# Patient Record
Sex: FEMALE | Race: WHITE | NOT HISPANIC OR LATINO | Employment: FULL TIME | URBAN - METROPOLITAN AREA
[De-identification: names, ages, dates, MRNs, and addresses within clinical notes are randomized per-mention and may not be internally consistent; named-entity substitution may affect disease eponyms.]

---

## 2017-02-09 ENCOUNTER — ALLSCRIPTS OFFICE VISIT (OUTPATIENT)
Dept: OTHER | Facility: OTHER | Age: 52
End: 2017-02-09

## 2017-02-09 DIAGNOSIS — S16.1XXA STRAIN OF MUSCLE, FASCIA AND TENDON AT NECK LEVEL, INITIAL ENCOUNTER: ICD-10-CM

## 2017-03-09 ENCOUNTER — TELEPHONE (OUTPATIENT)
Dept: OTHER | Facility: HOSPITAL | Age: 52
End: 2017-03-09

## 2017-03-13 ENCOUNTER — ALLSCRIPTS OFFICE VISIT (OUTPATIENT)
Dept: OTHER | Facility: OTHER | Age: 52
End: 2017-03-13

## 2017-05-27 ENCOUNTER — GENERIC CONVERSION - ENCOUNTER (OUTPATIENT)
Dept: OTHER | Facility: OTHER | Age: 52
End: 2017-05-27

## 2017-06-06 ENCOUNTER — ALLSCRIPTS OFFICE VISIT (OUTPATIENT)
Dept: OTHER | Facility: OTHER | Age: 52
End: 2017-06-06

## 2017-06-06 DIAGNOSIS — Z12.31 ENCOUNTER FOR SCREENING MAMMOGRAM FOR MALIGNANT NEOPLASM OF BREAST: ICD-10-CM

## 2017-06-06 DIAGNOSIS — E03.9 HYPOTHYROIDISM: ICD-10-CM

## 2017-08-07 ENCOUNTER — GENERIC CONVERSION - ENCOUNTER (OUTPATIENT)
Dept: OTHER | Facility: OTHER | Age: 52
End: 2017-08-07

## 2017-08-23 DIAGNOSIS — Z12.11 ENCOUNTER FOR SCREENING FOR MALIGNANT NEOPLASM OF COLON: ICD-10-CM

## 2017-08-23 DIAGNOSIS — Z12.12 ENCOUNTER FOR SCREENING FOR MALIGNANT NEOPLASM OF RECTUM: ICD-10-CM

## 2017-10-03 ENCOUNTER — ALLSCRIPTS OFFICE VISIT (OUTPATIENT)
Dept: OTHER | Facility: OTHER | Age: 52
End: 2017-10-03

## 2017-10-03 DIAGNOSIS — E78.00 PURE HYPERCHOLESTEROLEMIA: ICD-10-CM

## 2017-10-03 DIAGNOSIS — E03.9 HYPOTHYROIDISM: ICD-10-CM

## 2017-10-03 DIAGNOSIS — G43.109 MIGRAINE WITH AURA AND WITHOUT STATUS MIGRAINOSUS, NOT INTRACTABLE: ICD-10-CM
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
## 2017-12-11 ENCOUNTER — GENERIC CONVERSION - ENCOUNTER (OUTPATIENT)
Dept: INTERNAL MEDICINE CLINIC | Facility: CLINIC | Age: 52
End: 2017-12-11

## 2018-01-12 VITALS
TEMPERATURE: 98.5 F | HEIGHT: 65 IN | BODY MASS INDEX: 24.37 KG/M2 | WEIGHT: 146.25 LBS | HEART RATE: 60 BPM | OXYGEN SATURATION: 96 % | DIASTOLIC BLOOD PRESSURE: 78 MMHG | SYSTOLIC BLOOD PRESSURE: 112 MMHG

## 2018-01-13 VITALS
TEMPERATURE: 98 F | SYSTOLIC BLOOD PRESSURE: 122 MMHG | BODY MASS INDEX: 23.41 KG/M2 | HEART RATE: 67 BPM | HEIGHT: 65 IN | DIASTOLIC BLOOD PRESSURE: 80 MMHG | OXYGEN SATURATION: 98 % | WEIGHT: 140.5 LBS

## 2018-01-14 VITALS
OXYGEN SATURATION: 98 % | WEIGHT: 143.13 LBS | BODY MASS INDEX: 23.85 KG/M2 | SYSTOLIC BLOOD PRESSURE: 124 MMHG | TEMPERATURE: 98 F | HEIGHT: 65 IN | HEART RATE: 69 BPM | DIASTOLIC BLOOD PRESSURE: 78 MMHG

## 2018-01-14 VITALS
HEART RATE: 61 BPM | OXYGEN SATURATION: 98 % | DIASTOLIC BLOOD PRESSURE: 84 MMHG | BODY MASS INDEX: 24.16 KG/M2 | TEMPERATURE: 97.8 F | HEIGHT: 65 IN | SYSTOLIC BLOOD PRESSURE: 128 MMHG | WEIGHT: 145 LBS

## 2018-04-11 ENCOUNTER — OFFICE VISIT (OUTPATIENT)
Dept: INTERNAL MEDICINE CLINIC | Age: 53
End: 2018-04-11
Payer: COMMERCIAL

## 2018-04-11 VITALS
OXYGEN SATURATION: 96 % | HEIGHT: 64 IN | SYSTOLIC BLOOD PRESSURE: 112 MMHG | DIASTOLIC BLOOD PRESSURE: 78 MMHG | TEMPERATURE: 98.2 F | BODY MASS INDEX: 24.21 KG/M2 | WEIGHT: 141.8 LBS | HEART RATE: 62 BPM

## 2018-04-11 DIAGNOSIS — L30.9 ECZEMA OF BOTH HANDS: Primary | ICD-10-CM

## 2018-04-11 DIAGNOSIS — E03.9 ACQUIRED HYPOTHYROIDISM: ICD-10-CM

## 2018-04-11 DIAGNOSIS — G43.109 MIGRAINE WITH AURA AND WITHOUT STATUS MIGRAINOSUS, NOT INTRACTABLE: ICD-10-CM

## 2018-04-11 DIAGNOSIS — M54.16 LUMBAR RADICULOPATHY: ICD-10-CM

## 2018-04-11 PROBLEM — S16.1XXA CERVICAL STRAIN: Status: ACTIVE | Noted: 2017-02-09

## 2018-04-11 PROBLEM — R82.71 BACTERIA IN URINE: Status: ACTIVE | Noted: 2018-04-11

## 2018-04-11 PROCEDURE — 99214 OFFICE O/P EST MOD 30 MIN: CPT | Performed by: INTERNAL MEDICINE

## 2018-04-11 PROCEDURE — 3008F BODY MASS INDEX DOCD: CPT | Performed by: INTERNAL MEDICINE

## 2018-04-11 RX ORDER — PROPRANOLOL HYDROCHLORIDE 80 MG/1
80 CAPSULE, EXTENDED RELEASE ORAL DAILY
Qty: 90 CAPSULE | Refills: 1 | Status: SHIPPED | OUTPATIENT
Start: 2018-04-11 | End: 2018-10-23 | Stop reason: SDUPTHER

## 2018-04-11 RX ORDER — TRIAMCINOLONE ACETONIDE 5 MG/G
OINTMENT TOPICAL 2 TIMES DAILY
COMMUNITY
Start: 2015-12-04 | End: 2021-07-14 | Stop reason: ALTCHOICE

## 2018-04-11 RX ORDER — LEVOTHYROXINE SODIUM 0.05 MG/1
50 TABLET ORAL DAILY
Qty: 90 TABLET | Refills: 1 | Status: SHIPPED | OUTPATIENT
Start: 2018-04-11 | End: 2018-10-23 | Stop reason: SDUPTHER

## 2018-04-11 RX ORDER — SUMATRIPTAN 50 MG/1
1 TABLET, FILM COATED ORAL
COMMUNITY
End: 2018-04-11 | Stop reason: SDUPTHER

## 2018-04-11 RX ORDER — HYDROXYZINE HYDROCHLORIDE 25 MG/1
25 TABLET, FILM COATED ORAL AS NEEDED
COMMUNITY
Start: 2018-03-21 | End: 2022-03-14

## 2018-04-11 RX ORDER — SUMATRIPTAN 50 MG/1
50 TABLET, FILM COATED ORAL ONCE AS NEEDED
Qty: 9 TABLET | Refills: 0 | Status: SHIPPED | OUTPATIENT
Start: 2018-04-11 | End: 2018-10-23 | Stop reason: SDUPTHER

## 2018-04-11 RX ORDER — PROPRANOLOL HYDROCHLORIDE 80 MG/1
1 CAPSULE, EXTENDED RELEASE ORAL
COMMUNITY
Start: 2014-06-24 | End: 2018-04-11 | Stop reason: SDUPTHER

## 2018-04-11 RX ORDER — LEVOTHYROXINE SODIUM 0.05 MG/1
1 TABLET ORAL DAILY
COMMUNITY
Start: 2017-06-06 | End: 2018-04-11 | Stop reason: SDUPTHER

## 2018-04-11 NOTE — PROGRESS NOTES
Assessment/Plan:    Acquired hypothyroidism    Stable  Continue with levothyroxine 50 mcg daily  Classic migraine with aura    Well controlled  Continue with propranolol 80 mg daily for prophylaxis and sumatriptan 50 mg as needed for breakthrough headaches  Lumbar radiculopathy    Pain recommend starting physical therapy exercises and following up with her Spine and Pain Specialist     Eczema of both hands    Controlled  Continue with triamcinolone ointment as needed for acute flares  Bacteria in urine   Asymptomatic  Will defer on starting antibiotic therapy at this time  Advised patient contact office if she develops any symptoms  Diagnoses and all orders for this visit:    Eczema of both hands    Lumbar radiculopathy    Acquired hypothyroidism  -     levothyroxine 50 mcg tablet; Take 1 tablet (50 mcg total) by mouth daily    Migraine with aura and without status migrainosus, not intractable  -     propranolol (INDERAL LA) 80 mg 24 hr capsule; Take 1 capsule (80 mg total) by mouth daily  -     SUMAtriptan (IMITREX) 50 mg tablet; Take 1 tablet (50 mg total) by mouth once as needed for migraine for up to 1 dose    Other orders  -     hydrOXYzine HCL (ATARAX) 25 mg tablet;   -     Discontinue: levothyroxine 50 mcg tablet; Take 1 tablet by mouth daily  -     Discontinue: propranolol (INDERAL LA) 80 mg 24 hr capsule; Take 1 capsule by mouth  -     Discontinue: SUMAtriptan (IMITREX) 50 mg tablet; Take 1 tablet by mouth  -     triamcinolone (KENALOG) 0 5 % ointment; Apply topically 2 (two) times a day          Subjective:      Patient ID: Nilsa Driver is a 45 y o  female  49-year-old female is seen today for follow-up of chronic conditions  She has no active complaints or concerns at this time  Laboratory studies reviewed today  It is worth noting that her urinalysis was positive for leukocytes at 25, but negative for nitrites    A reflex urine culture was positive for beta hemolytic group B strep, however she is asymptomatic and reports that she has never had a urinary tract infection  Regarding her migraine headaches, she is compliant with propanolol for prophylaxis and takes Imitrex as needed for breakthrough headaches  She reports that she has not been getting many headaches per month and current therapy is working appropriately  Thyroid Problem   Presents for follow-up visit  Patient reports no cold intolerance, constipation, diaphoresis, diarrhea, fatigue, heat intolerance or palpitations  The symptoms have been stable  Rash   This is a chronic problem  The current episode started more than 1 year ago  The problem is unchanged  The affected locations include the left hand  Pertinent negatives include no congestion, cough, diarrhea, fatigue, fever, rhinorrhea, shortness of breath, sore throat or vomiting  Past treatments include topical steroids  The treatment provided significant relief  Her past medical history is significant for eczema  The following portions of the patient's history were reviewed and updated as appropriate: allergies, current medications, past family history, past medical history, past social history, past surgical history and problem list     Review of Systems   Constitutional: Negative for activity change, appetite change, chills, diaphoresis, fatigue and fever  HENT: Negative for congestion, postnasal drip, rhinorrhea, sinus pain, sinus pressure, sneezing and sore throat  Eyes: Negative for visual disturbance  Respiratory: Negative for apnea, cough, choking, chest tightness, shortness of breath and wheezing  Cardiovascular: Negative for chest pain, palpitations and leg swelling  Gastrointestinal: Negative for abdominal distention, abdominal pain, anal bleeding, blood in stool, constipation, diarrhea, nausea and vomiting  Endocrine: Negative for cold intolerance and heat intolerance     Genitourinary: Negative for difficulty urinating, dysuria and hematuria  Musculoskeletal: Negative  Skin: Positive for rash  Neurological: Negative for dizziness, weakness, light-headedness, numbness and headaches  Hematological: Negative for adenopathy  Psychiatric/Behavioral: Negative for agitation, sleep disturbance and suicidal ideas  All other systems reviewed and are negative  Past Medical History:   Diagnosis Date    Allergic rhinitis due to pollen     last assessed 11/11/13    Asthma     Common migraine without aura     last assessed 06/03/14    Seasonal allergies     last assessed 11/11/13         Current Outpatient Prescriptions:     hydrOXYzine HCL (ATARAX) 25 mg tablet, , Disp: , Rfl:     levothyroxine 50 mcg tablet, Take 1 tablet (50 mcg total) by mouth daily, Disp: 90 tablet, Rfl: 1    propranolol (INDERAL LA) 80 mg 24 hr capsule, Take 1 capsule (80 mg total) by mouth daily, Disp: 90 capsule, Rfl: 1    SUMAtriptan (IMITREX) 50 mg tablet, Take 1 tablet (50 mg total) by mouth once as needed for migraine for up to 1 dose, Disp: 9 tablet, Rfl: 0    triamcinolone (KENALOG) 0 5 % ointment, Apply topically 2 (two) times a day, Disp: , Rfl:     Allergies   Allergen Reactions    Seasonal Ic [Cholestatin] Nasal Congestion       Social History   Past Surgical History:   Procedure Laterality Date    CARPAL TUNNEL RELEASE Bilateral 2003    TONSILLECTOMY       Family History   Problem Relation Age of Onset    Coronary artery disease Mother     Diabetes Mother      in her 42's    Hypertension Mother     Other Mother      2 children living    Heart failure Mother     Colon cancer Father        Objective:  /78 (BP Location: Left arm, Patient Position: Sitting, Cuff Size: Standard)   Pulse 62   Temp 98 2 °F (36 8 °C) (Tympanic)   Ht 5' 4 33" (1 634 m)   Wt 64 3 kg (141 lb 12 8 oz)   SpO2 96%   BMI 24 09 kg/m²     No results found for this or any previous visit (from the past 1344 hour(s))           Physical Exam Constitutional: She is oriented to person, place, and time  She appears well-developed and well-nourished  No distress  HENT:   Head: Normocephalic and atraumatic  Eyes: Conjunctivae and EOM are normal  Pupils are equal, round, and reactive to light  Right eye exhibits no discharge  Left eye exhibits no discharge  Neck: Normal range of motion  Neck supple  No JVD present  No thyromegaly present  Cardiovascular: Normal rate, regular rhythm, normal heart sounds and intact distal pulses  Exam reveals no gallop and no friction rub  No murmur heard  Pulmonary/Chest: Effort normal and breath sounds normal  No respiratory distress  She has no wheezes  She has no rales  She exhibits no tenderness  Abdominal: Soft  She exhibits no distension  There is no tenderness  Musculoskeletal: Normal range of motion  She exhibits no edema, tenderness or deformity  Lymphadenopathy:     She has no cervical adenopathy  Neurological: She is alert and oriented to person, place, and time  No cranial nerve deficit  Coordination normal    Skin: Skin is warm and dry  No rash noted  She is not diaphoretic  No erythema  No pallor  Psychiatric: She has a normal mood and affect  Her behavior is normal  Judgment and thought content normal    Nursing note and vitals reviewed

## 2018-04-11 NOTE — ASSESSMENT & PLAN NOTE
Pain recommend starting physical therapy exercises and following up with her Spine and Pain Specialist

## 2018-04-11 NOTE — ASSESSMENT & PLAN NOTE
Well controlled  Continue with propranolol 80 mg daily for prophylaxis and sumatriptan 50 mg as needed for breakthrough headaches

## 2018-04-11 NOTE — ASSESSMENT & PLAN NOTE
Asymptomatic  Will defer on starting antibiotic therapy at this time  Advised patient contact office if she develops any symptoms

## 2018-04-29 DIAGNOSIS — G43.109 MIGRAINE WITH AURA AND WITHOUT STATUS MIGRAINOSUS, NOT INTRACTABLE: ICD-10-CM

## 2018-04-30 RX ORDER — SUMATRIPTAN 50 MG/1
TABLET, FILM COATED ORAL
Qty: 18 TABLET | Refills: 3 | OUTPATIENT
Start: 2018-04-30

## 2018-10-23 ENCOUNTER — OFFICE VISIT (OUTPATIENT)
Dept: INTERNAL MEDICINE CLINIC | Age: 53
End: 2018-10-23
Payer: COMMERCIAL

## 2018-10-23 VITALS
HEIGHT: 64 IN | DIASTOLIC BLOOD PRESSURE: 72 MMHG | HEART RATE: 59 BPM | OXYGEN SATURATION: 99 % | TEMPERATURE: 97.2 F | BODY MASS INDEX: 24.89 KG/M2 | WEIGHT: 145.8 LBS | SYSTOLIC BLOOD PRESSURE: 100 MMHG

## 2018-10-23 DIAGNOSIS — M54.16 LUMBAR RADICULOPATHY: Primary | ICD-10-CM

## 2018-10-23 DIAGNOSIS — E03.9 ACQUIRED HYPOTHYROIDISM: ICD-10-CM

## 2018-10-23 DIAGNOSIS — G43.109 MIGRAINE WITH AURA AND WITHOUT STATUS MIGRAINOSUS, NOT INTRACTABLE: ICD-10-CM

## 2018-10-23 PROBLEM — R82.71 BACTERIA IN URINE: Status: RESOLVED | Noted: 2018-04-11 | Resolved: 2018-10-23

## 2018-10-23 PROCEDURE — 3008F BODY MASS INDEX DOCD: CPT | Performed by: INTERNAL MEDICINE

## 2018-10-23 PROCEDURE — 99214 OFFICE O/P EST MOD 30 MIN: CPT | Performed by: INTERNAL MEDICINE

## 2018-10-23 PROCEDURE — 1036F TOBACCO NON-USER: CPT | Performed by: INTERNAL MEDICINE

## 2018-10-23 RX ORDER — SUMATRIPTAN 50 MG/1
50 TABLET, FILM COATED ORAL ONCE AS NEEDED
Qty: 9 TABLET | Refills: 3 | Status: SHIPPED | OUTPATIENT
Start: 2018-10-23 | End: 2019-05-22 | Stop reason: SDUPTHER

## 2018-10-23 RX ORDER — PROPRANOLOL HYDROCHLORIDE 80 MG/1
80 CAPSULE, EXTENDED RELEASE ORAL DAILY
Qty: 90 CAPSULE | Refills: 1 | Status: SHIPPED | OUTPATIENT
Start: 2018-10-23 | End: 2019-05-07 | Stop reason: SDUPTHER

## 2018-10-23 RX ORDER — LEVOTHYROXINE SODIUM 0.05 MG/1
50 TABLET ORAL DAILY
Qty: 90 TABLET | Refills: 1 | Status: SHIPPED | OUTPATIENT
Start: 2018-10-23 | End: 2019-05-22 | Stop reason: SDUPTHER

## 2018-10-23 NOTE — PROGRESS NOTES
Assessment/Plan:    No problem-specific Assessment & Plan notes found for this encounter  Diagnoses and all orders for this visit:    Lumbar radiculopathy  Patient is a lumbar radiculopathy and she is seeing the pain management for possible epidural injection she had an MRI done results of the MRI are not in the chart  Patient has more symptoms of radicular pain on the left than on the right side she had a similar kind of problems about 6 years ago and the received 3  Epidural injections  Which improved the pain  Acquired hypothyroidism  -     levothyroxine 50 mcg tablet; Take 1 tablet (50 mcg total) by mouth daily   will order the TSH for her she does not have any signs or symptoms of hypo or hyperthyroidism  Migraine with aura and without status migrainosus, not intractable  -     propranolol (INDERAL LA) 80 mg 24 hr capsule; Take 1 capsule (80 mg total) by mouth daily  -     SUMAtriptan (IMITREX) 50 mg tablet; Take 1 tablet (50 mg total) by mouth once as needed for migraine for up to 1 dose   migraines are much better than before,  They are less frequent and less intense plan is to continue with the present management no changes        Subjective:    no new complaint except for the back pain for which she is followed up by the Pain Management and Orthopedics   Patient ID: Cheryl Lawton is a 52 y o  female  HPI    The following portions of the patient's history were reviewed and updated as appropriate: allergies, current medications, past family history, past medical history, past social history, past surgical history and problem list     Review of Systems   Constitutional: Negative for activity change, appetite change, chills, diaphoresis, fatigue and fever  HENT: Negative for congestion, postnasal drip, rhinorrhea, sinus pain, sinus pressure, sneezing and sore throat  Eyes: Negative for visual disturbance     Respiratory: Negative for apnea, cough, choking, chest tightness, shortness of breath and wheezing  Cardiovascular: Negative for chest pain, palpitations and leg swelling  Gastrointestinal: Negative for abdominal distention, abdominal pain, anal bleeding, blood in stool, constipation, diarrhea, nausea and vomiting  Endocrine: Negative for cold intolerance and heat intolerance  Genitourinary: Negative for difficulty urinating, dysuria and hematuria  Musculoskeletal: Negative  Skin: Negative for rash  Neurological: Negative for dizziness, weakness, light-headedness, numbness and headaches  Hematological: Negative for adenopathy  Psychiatric/Behavioral: Negative for agitation, sleep disturbance and suicidal ideas  All other systems reviewed and are negative          Past Medical History:   Diagnosis Date    Allergic rhinitis due to pollen     last assessed 11/11/13    Asthma     Common migraine without aura     last assessed 06/03/14    Seasonal allergies     last assessed 11/11/13         Current Outpatient Prescriptions:     hydrOXYzine HCL (ATARAX) 25 mg tablet, Take 25 mg by mouth as needed  , Disp: , Rfl:     levothyroxine 50 mcg tablet, Take 1 tablet (50 mcg total) by mouth daily, Disp: 90 tablet, Rfl: 1    propranolol (INDERAL LA) 80 mg 24 hr capsule, Take 1 capsule (80 mg total) by mouth daily, Disp: 90 capsule, Rfl: 1    SUMAtriptan (IMITREX) 50 mg tablet, Take 1 tablet (50 mg total) by mouth once as needed for migraine for up to 1 dose, Disp: 9 tablet, Rfl: 0    triamcinolone (KENALOG) 0 5 % ointment, Apply topically 2 (two) times a day, Disp: , Rfl:     Allergies   Allergen Reactions    Seasonal Ic [Cholestatin] Nasal Congestion       Social History   Past Surgical History:   Procedure Laterality Date    CARPAL TUNNEL RELEASE Bilateral 2003    TONSILLECTOMY       Family History   Problem Relation Age of Onset    Coronary artery disease Mother     Diabetes Mother         in her 42's    Hypertension Mother    Ilia Sandoval Other Mother         2 children living  Heart failure Mother     Colon cancer Father        Objective:  /72 (BP Location: Left arm, Patient Position: Sitting, Cuff Size: Adult)   Pulse 59   Temp (!) 97 2 °F (36 2 °C) (Tympanic)   Ht 5' 4 07" (1 628 m)   Wt 66 1 kg (145 lb 12 8 oz)   SpO2 99% Comment: room air  BMI 24 97 kg/m²        Physical Exam   Constitutional: She is oriented to person, place, and time  She appears well-developed and well-nourished  No distress  HENT:   Head: Normocephalic and atraumatic  Eyes: Pupils are equal, round, and reactive to light  Conjunctivae and EOM are normal  Right eye exhibits no discharge  Left eye exhibits no discharge  Neck: Normal range of motion  Neck supple  No JVD present  No thyromegaly present  Cardiovascular: Normal rate, regular rhythm, normal heart sounds and intact distal pulses  Exam reveals no gallop and no friction rub  No murmur heard  Pulmonary/Chest: Effort normal and breath sounds normal  No respiratory distress  She has no wheezes  She has no rales  She exhibits no tenderness  Abdominal: Soft  She exhibits no distension  There is no tenderness  Musculoskeletal: Normal range of motion  She exhibits no edema, tenderness or deformity  Lymphadenopathy:     She has no cervical adenopathy  Neurological: She is alert and oriented to person, place, and time  No cranial nerve deficit  Coordination normal    Skin: Skin is warm and dry  No rash noted  She is not diaphoretic  No erythema  No pallor  Psychiatric: She has a normal mood and affect  Her behavior is normal  Judgment and thought content normal    Nursing note and vitals reviewed  No results found for this or any previous visit (from the past 672 hour(s))

## 2019-01-18 ENCOUNTER — OFFICE VISIT (OUTPATIENT)
Dept: INTERNAL MEDICINE CLINIC | Facility: CLINIC | Age: 54
End: 2019-01-18
Payer: COMMERCIAL

## 2019-01-18 VITALS
TEMPERATURE: 98.1 F | SYSTOLIC BLOOD PRESSURE: 110 MMHG | OXYGEN SATURATION: 99 % | HEIGHT: 64 IN | WEIGHT: 146.4 LBS | HEART RATE: 72 BPM | BODY MASS INDEX: 25 KG/M2 | DIASTOLIC BLOOD PRESSURE: 82 MMHG

## 2019-01-18 DIAGNOSIS — B30.9 VIRAL CONJUNCTIVITIS OF RIGHT EYE: Primary | ICD-10-CM

## 2019-01-18 PROCEDURE — 3008F BODY MASS INDEX DOCD: CPT | Performed by: INTERNAL MEDICINE

## 2019-01-18 PROCEDURE — 1036F TOBACCO NON-USER: CPT | Performed by: INTERNAL MEDICINE

## 2019-01-18 PROCEDURE — 99213 OFFICE O/P EST LOW 20 MIN: CPT | Performed by: INTERNAL MEDICINE

## 2019-01-18 RX ORDER — NAPROXEN SODIUM 220 MG
220 TABLET ORAL AS NEEDED
COMMUNITY
End: 2021-01-20

## 2019-01-18 RX ORDER — AZELASTINE HYDROCHLORIDE 0.5 MG/ML
1 SOLUTION/ DROPS OPHTHALMIC 2 TIMES DAILY
Qty: 6 ML | Refills: 0 | Status: SHIPPED | OUTPATIENT
Start: 2019-01-18 | End: 2019-05-22 | Stop reason: ALTCHOICE

## 2019-01-18 NOTE — PROGRESS NOTES
Assessment/Plan:    Viral conjunctivitis of right eye  Will treat with azelastine eye drops, BID to the right eye until resolution of symptoms  Continue with warm compresses  Discussed droplet precautions  She is to contact office for worsening symptoms  Diagnoses and all orders for this visit:    Viral conjunctivitis of right eye  -     azelastine (OPTIVAR) 0 05 % ophthalmic solution; Administer 1 drop to the right eye 2 (two) times a day    Other orders  -     naproxen sodium (ALEVE) 220 MG tablet; Take 220 mg by mouth as needed for mild pain          Subjective:      Patient ID: Padilla Smith is a 52 y o  female  52year old female is seen today with acute symptoms of right eye conjunctivitis  Symptoms started yesterday  She has been applying warm compresses as well as Visine eyedrops with no improvement of symptoms  She denies any recent sick contacts  Conjunctivitis    The current episode started yesterday  The onset was sudden  The problem has been unchanged  The problem is mild  Nothing (warm compress, Visine) relieves the symptoms  Nothing aggravates the symptoms  Associated symptoms include eye discharge (Clear) and eye redness  Pertinent negatives include no orthopnea, no fever, no decreased vision, no double vision, no eye itching, no photophobia, no abdominal pain, no constipation, no diarrhea, no nausea, no vomiting, no congestion, no ear discharge, no ear pain, no headaches, no hearing loss, no mouth sores, no rhinorrhea, no sore throat, no stridor, no swollen glands, no muscle aches, no neck pain, no neck stiffness, no cough, no URI, no wheezing, no rash, no diaper rash and no eye pain  The eye pain is mild  The right eye is affected  The eye pain is not associated with movement  The eyelid exhibits redness         The following portions of the patient's history were reviewed and updated as appropriate: allergies, current medications, past family history, past medical history, past social history, past surgical history and problem list     Review of Systems   Constitutional: Negative for activity change, appetite change, chills, diaphoresis, fatigue and fever  HENT: Negative for congestion, ear discharge, ear pain, hearing loss, mouth sores, postnasal drip, rhinorrhea, sinus pain, sinus pressure, sneezing and sore throat  Eyes: Positive for discharge (Clear) and redness  Negative for double vision, photophobia, pain, itching and visual disturbance  Respiratory: Negative for apnea, cough, choking, chest tightness, shortness of breath, wheezing and stridor  Cardiovascular: Negative for chest pain, palpitations, orthopnea and leg swelling  Gastrointestinal: Negative for abdominal distention, abdominal pain, anal bleeding, blood in stool, constipation, diarrhea, nausea and vomiting  Endocrine: Negative for cold intolerance and heat intolerance  Genitourinary: Negative for difficulty urinating, dysuria and hematuria  Musculoskeletal: Negative  Negative for neck pain  Skin: Negative  Negative for rash  Neurological: Negative for dizziness, weakness, light-headedness, numbness and headaches  Hematological: Negative for adenopathy  Psychiatric/Behavioral: Negative for agitation, sleep disturbance and suicidal ideas  All other systems reviewed and are negative          Past Medical History:   Diagnosis Date    Allergic rhinitis due to pollen     last assessed 11/11/13    Asthma     Common migraine without aura     last assessed 06/03/14    Seasonal allergies     last assessed 11/11/13         Current Outpatient Prescriptions:     hydrOXYzine HCL (ATARAX) 25 mg tablet, Take 25 mg by mouth as needed  , Disp: , Rfl:     levothyroxine 50 mcg tablet, Take 1 tablet (50 mcg total) by mouth daily, Disp: 90 tablet, Rfl: 1    naproxen sodium (ALEVE) 220 MG tablet, Take 220 mg by mouth as needed for mild pain, Disp: , Rfl:     propranolol (INDERAL LA) 80 mg 24 hr capsule, Take 1 capsule (80 mg total) by mouth daily, Disp: 90 capsule, Rfl: 1    SUMAtriptan (IMITREX) 50 mg tablet, Take 1 tablet (50 mg total) by mouth once as needed for migraine for up to 1 dose, Disp: 9 tablet, Rfl: 3    azelastine (OPTIVAR) 0 05 % ophthalmic solution, Administer 1 drop to the right eye 2 (two) times a day, Disp: 6 mL, Rfl: 0    triamcinolone (KENALOG) 0 5 % ointment, Apply topically 2 (two) times a day, Disp: , Rfl:     Allergies   Allergen Reactions    Seasonal Ic [Cholestatin] Nasal Congestion       Social History   Past Surgical History:   Procedure Laterality Date    CARPAL TUNNEL RELEASE Bilateral 2003    TONSILLECTOMY       Family History   Problem Relation Age of Onset    Coronary artery disease Mother     Diabetes Mother         in her 42's    Hypertension Mother     Other Mother         2 children living    Heart failure Mother     Colon cancer Father        Objective:  /82 (BP Location: Left arm, Patient Position: Sitting, Cuff Size: Adult)   Pulse 72   Temp 98 1 °F (36 7 °C) (Oral)   Ht 5' 4" (1 626 m)   Wt 66 4 kg (146 lb 6 4 oz)   SpO2 99% Comment: room air  BMI 25 13 kg/m²     No results found for this or any previous visit (from the past 1344 hour(s))  Physical Exam   Constitutional: She is oriented to person, place, and time  She appears well-developed and well-nourished  No distress  HENT:   Head: Normocephalic and atraumatic  Eyes: Pupils are equal, round, and reactive to light  Conjunctivae and EOM are normal  Right eye exhibits no discharge  Left eye exhibits no discharge  Neck: Normal range of motion  Neck supple  No JVD present  No thyromegaly present  Cardiovascular: Normal rate, regular rhythm, normal heart sounds and intact distal pulses  Exam reveals no gallop and no friction rub  No murmur heard  Pulmonary/Chest: Effort normal and breath sounds normal  No respiratory distress  She has no wheezes  She has no rales   She exhibits no tenderness  Abdominal: Soft  She exhibits no distension  There is no tenderness  Musculoskeletal: Normal range of motion  She exhibits no edema, tenderness or deformity  Lymphadenopathy:     She has no cervical adenopathy  Neurological: She is alert and oriented to person, place, and time  No cranial nerve deficit  Coordination normal    Skin: Skin is warm and dry  No rash noted  She is not diaphoretic  No erythema  No pallor  Psychiatric: She has a normal mood and affect  Her behavior is normal  Judgment and thought content normal    Nursing note and vitals reviewed

## 2019-05-07 DIAGNOSIS — G43.109 MIGRAINE WITH AURA AND WITHOUT STATUS MIGRAINOSUS, NOT INTRACTABLE: ICD-10-CM

## 2019-05-07 RX ORDER — PROPRANOLOL HYDROCHLORIDE 80 MG/1
80 CAPSULE, EXTENDED RELEASE ORAL DAILY
Qty: 90 CAPSULE | Refills: 1 | Status: SHIPPED | OUTPATIENT
Start: 2019-05-07 | End: 2019-05-22 | Stop reason: SDUPTHER

## 2019-05-22 ENCOUNTER — OFFICE VISIT (OUTPATIENT)
Dept: INTERNAL MEDICINE CLINIC | Age: 54
End: 2019-05-22
Payer: COMMERCIAL

## 2019-05-22 VITALS
HEART RATE: 66 BPM | TEMPERATURE: 98 F | BODY MASS INDEX: 25.68 KG/M2 | SYSTOLIC BLOOD PRESSURE: 110 MMHG | WEIGHT: 149.6 LBS | DIASTOLIC BLOOD PRESSURE: 80 MMHG | OXYGEN SATURATION: 98 %

## 2019-05-22 DIAGNOSIS — E83.51 HYPOCALCEMIA: ICD-10-CM

## 2019-05-22 DIAGNOSIS — E55.9 VITAMIN D DEFICIENCY: Primary | ICD-10-CM

## 2019-05-22 DIAGNOSIS — E03.9 ACQUIRED HYPOTHYROIDISM: ICD-10-CM

## 2019-05-22 DIAGNOSIS — G43.109 MIGRAINE WITH AURA AND WITHOUT STATUS MIGRAINOSUS, NOT INTRACTABLE: ICD-10-CM

## 2019-05-22 DIAGNOSIS — M54.16 LUMBAR RADICULOPATHY: ICD-10-CM

## 2019-05-22 PROBLEM — B30.9 VIRAL CONJUNCTIVITIS OF RIGHT EYE: Status: RESOLVED | Noted: 2019-01-18 | Resolved: 2019-05-22

## 2019-05-22 PROBLEM — S16.1XXA CERVICAL STRAIN: Status: RESOLVED | Noted: 2017-02-09 | Resolved: 2019-05-22

## 2019-05-22 PROBLEM — L30.9 ECZEMA OF BOTH HANDS: Status: RESOLVED | Noted: 2018-04-11 | Resolved: 2019-05-22

## 2019-05-22 PROCEDURE — 1036F TOBACCO NON-USER: CPT | Performed by: INTERNAL MEDICINE

## 2019-05-22 PROCEDURE — 99214 OFFICE O/P EST MOD 30 MIN: CPT | Performed by: INTERNAL MEDICINE

## 2019-05-22 RX ORDER — PROPRANOLOL HYDROCHLORIDE 80 MG/1
80 CAPSULE, EXTENDED RELEASE ORAL DAILY
Qty: 90 CAPSULE | Refills: 1 | Status: SHIPPED | OUTPATIENT
Start: 2019-05-22 | End: 2019-11-12

## 2019-05-22 RX ORDER — LEVOTHYROXINE SODIUM 0.05 MG/1
50 TABLET ORAL DAILY
Qty: 90 TABLET | Refills: 1 | Status: SHIPPED | OUTPATIENT
Start: 2019-05-22 | End: 2020-01-28 | Stop reason: SDUPTHER

## 2019-05-22 RX ORDER — MELATONIN
1000 DAILY
Qty: 30 TABLET | Refills: 5
Start: 2019-05-22

## 2019-05-22 RX ORDER — SUMATRIPTAN 50 MG/1
50 TABLET, FILM COATED ORAL ONCE AS NEEDED
Qty: 9 TABLET | Refills: 3 | Status: SHIPPED | OUTPATIENT
Start: 2019-05-22 | End: 2020-06-16 | Stop reason: SDUPTHER

## 2019-05-22 RX ORDER — B-COMPLEX WITH VITAMIN C
1 TABLET ORAL
Qty: 30 TABLET | Refills: 5
Start: 2019-05-22

## 2019-06-17 ENCOUNTER — TELEPHONE (OUTPATIENT)
Dept: INTERNAL MEDICINE CLINIC | Facility: CLINIC | Age: 54
End: 2019-06-17

## 2019-06-17 PROBLEM — E55.9 VITAMIN D DEFICIENCY: Status: ACTIVE | Noted: 2019-06-17

## 2019-11-12 ENCOUNTER — OFFICE VISIT (OUTPATIENT)
Dept: INTERNAL MEDICINE CLINIC | Age: 54
End: 2019-11-12
Payer: COMMERCIAL

## 2019-11-12 VITALS
DIASTOLIC BLOOD PRESSURE: 90 MMHG | WEIGHT: 147.4 LBS | HEART RATE: 58 BPM | OXYGEN SATURATION: 96 % | BODY MASS INDEX: 25.16 KG/M2 | TEMPERATURE: 97.7 F | SYSTOLIC BLOOD PRESSURE: 124 MMHG | HEIGHT: 64 IN

## 2019-11-12 DIAGNOSIS — G43.109 MIGRAINE WITH AURA AND WITHOUT STATUS MIGRAINOSUS, NOT INTRACTABLE: ICD-10-CM

## 2019-11-12 DIAGNOSIS — L03.012 CELLULITIS OF FINGER OF LEFT HAND: ICD-10-CM

## 2019-11-12 DIAGNOSIS — Z11.59 NEED FOR HEPATITIS C SCREENING TEST: ICD-10-CM

## 2019-11-12 DIAGNOSIS — Z12.39 BREAST CANCER SCREENING: Primary | ICD-10-CM

## 2019-11-12 PROCEDURE — 99214 OFFICE O/P EST MOD 30 MIN: CPT | Performed by: INTERNAL MEDICINE

## 2019-11-12 PROCEDURE — 1036F TOBACCO NON-USER: CPT | Performed by: INTERNAL MEDICINE

## 2019-11-12 RX ORDER — PROPRANOLOL HCL 60 MG
60 CAPSULE, EXTENDED RELEASE 24HR ORAL DAILY
Qty: 15 CAPSULE | Refills: 0 | Status: SHIPPED | OUTPATIENT
Start: 2019-11-12 | End: 2020-06-16 | Stop reason: ALTCHOICE

## 2019-11-12 RX ORDER — DOXYCYCLINE HYCLATE 100 MG
100 TABLET ORAL 2 TIMES DAILY
Qty: 14 TABLET | Refills: 0 | Status: SHIPPED | OUTPATIENT
Start: 2019-11-12 | End: 2019-11-19

## 2019-11-12 NOTE — PROGRESS NOTES
Assessment/Plan:    Cellulitis of finger of left hand  Patient is complaining of pain and her left little finger there is a sore right on the dorsal aspect of the finger just below the nail and there is the circular redness around it suggestive of infection       Diagnoses and all orders for this visit:    Breast cancer screening    Need for hepatitis C screening test  -     Hepatitis C antibody; Future    Migraine with aura and without status migrainosus, not intractable  -     propranolol (INDERAL LA) 60 mg 24 hr capsule; Take 1 capsule (60 mg total) by mouth daily  -     doxycycline hyclate (VIBRA-TABS) 100 mg tablet; Take 1 tablet (100 mg total) by mouth 2 (two) times a day for 7 days    Cellulitis of finger of left hand    Other orders  -     Cancel: Mammo screening bilateral w cad; Future        Subjective:   Chief Complaint   Patient presents with    Follow-up     Allergist asking to come off Beta Blockers     Hand Pain        Patient ID: Susan Friday is a 62 y o  female  HPI  This is a very pleasant 62 years young lady for regular follow-up complaining of a pain in her left little finger and some sore on top of that and also she was seen by the allergist and immunologist and he recommend that she should stop taking her beta-blocker she was taking beta-blocker for the prevention of migraine and it was working very well recently migraines are not very common for her  After discussion with her we will try to wean her off from the beta-blocker and in case if she gets migraine more cough done then we will use some other medication for her for migraine prophylaxis  The following portions of the patient's history were reviewed and updated as appropriate: allergies, current medications, past family history, past medical history, past social history, past surgical history and problem list     Review of Systems   Constitutional: Negative for chills and fatigue     HENT: Negative for congestion, ear pain, hearing loss, postnasal drip, sinus pressure, sore throat and voice change  Eyes: Negative for pain, discharge and visual disturbance  Respiratory: Negative for cough, chest tightness and shortness of breath  Cardiovascular: Negative for chest pain, palpitations and leg swelling  Gastrointestinal: Negative for abdominal pain, blood in stool, diarrhea, nausea and rectal pain  Genitourinary: Negative for difficulty urinating, dysuria and urgency  Musculoskeletal: Negative for arthralgias and joint swelling  Skin: Negative for rash  Allergic/Immunologic: Negative for environmental allergies and food allergies  Neurological: Negative for dizziness, tremors, weakness, numbness and headaches  Hematological: Negative for adenopathy  Psychiatric/Behavioral: Negative for behavioral problems and hallucinations           Past Medical History:   Diagnosis Date    Allergic rhinitis due to pollen     last assessed 11/11/13    Asthma     Common migraine without aura     last assessed 06/03/14    Seasonal allergies     last assessed 11/11/13         Current Outpatient Medications:     calcium carbonate-vitamin D (OSCAL-D) 500 mg-200 units per tablet, Take 1 tablet by mouth daily with breakfast, Disp: 30 tablet, Rfl: 5    Cetirizine HCl (ZYRTEC PO), Take by mouth as needed, Disp: , Rfl:     cholecalciferol (VITAMIN D3) 1,000 units tablet, Take 1 tablet (1,000 Units total) by mouth daily, Disp: 30 tablet, Rfl: 5    hydrOXYzine HCL (ATARAX) 25 mg tablet, Take 25 mg by mouth as needed  , Disp: , Rfl:     levothyroxine 50 mcg tablet, Take 1 tablet (50 mcg total) by mouth daily, Disp: 90 tablet, Rfl: 1    naproxen sodium (ALEVE) 220 MG tablet, Take 220 mg by mouth as needed for mild pain, Disp: , Rfl:     propranolol (INDERAL LA) 60 mg 24 hr capsule, Take 1 capsule (60 mg total) by mouth daily, Disp: 15 capsule, Rfl: 0    SUMAtriptan (IMITREX) 50 mg tablet, Take 1 tablet (50 mg total) by mouth once as needed for migraine for up to 1 dose, Disp: 9 tablet, Rfl: 3    triamcinolone (KENALOG) 0 5 % ointment, Apply topically 2 (two) times a day, Disp: , Rfl:     Allergies   Allergen Reactions    Seasonal Ic [Cholestatin] Nasal Congestion       Social History   Past Surgical History:   Procedure Laterality Date    CARPAL TUNNEL RELEASE Bilateral 2003    TONSILLECTOMY       Family History   Problem Relation Age of Onset    Coronary artery disease Mother     Diabetes Mother         in her 42's    Hypertension Mother     Other Mother         2 children living    Heart failure Mother     Colon cancer Father        Objective:  /90 (BP Location: Left arm, Patient Position: Sitting, Cuff Size: Adult)   Pulse 58   Temp 97 7 °F (36 5 °C) (Tympanic)   Ht 5' 4 17" (1 63 m)   Wt 66 9 kg (147 lb 6 4 oz)   SpO2 96%   BMI 25 16 kg/m²        Physical Exam   Constitutional: She is oriented to person, place, and time  She appears well-developed and well-nourished  HENT:   Right Ear: External ear normal    Mouth/Throat: Oropharynx is clear and moist    Eyes: Pupils are equal, round, and reactive to light  Conjunctivae and EOM are normal    Neck: Normal range of motion  No JVD present  No thyromegaly present  Cardiovascular: Normal rate, regular rhythm, normal heart sounds and intact distal pulses  Pulmonary/Chest: Breath sounds normal    Abdominal: Soft  Bowel sounds are normal    Musculoskeletal: Normal range of motion  Lymphadenopathy:     She has no cervical adenopathy  Neurological: She is alert and oriented to person, place, and time  She has normal reflexes  Skin: Skin is dry  There is a small sore on the left side of the left little finger and also on the dorsum aspect there is some cellulitis   Psychiatric: She has a normal mood and affect  Her behavior is normal  Judgment and thought content normal          No results found for this or any previous visit (from the past 672 hour(s))

## 2020-01-28 DIAGNOSIS — E03.9 ACQUIRED HYPOTHYROIDISM: ICD-10-CM

## 2020-01-28 RX ORDER — LEVOTHYROXINE SODIUM 0.05 MG/1
50 TABLET ORAL DAILY
Qty: 90 TABLET | Refills: 1 | Status: SHIPPED | OUTPATIENT
Start: 2020-01-28 | End: 2020-06-16 | Stop reason: SDUPTHER

## 2020-06-16 ENCOUNTER — OFFICE VISIT (OUTPATIENT)
Dept: INTERNAL MEDICINE CLINIC | Age: 55
End: 2020-06-16
Payer: COMMERCIAL

## 2020-06-16 VITALS
SYSTOLIC BLOOD PRESSURE: 120 MMHG | HEART RATE: 70 BPM | HEIGHT: 64 IN | OXYGEN SATURATION: 98 % | TEMPERATURE: 98.7 F | BODY MASS INDEX: 22.02 KG/M2 | WEIGHT: 129 LBS | DIASTOLIC BLOOD PRESSURE: 82 MMHG

## 2020-06-16 DIAGNOSIS — E03.9 ACQUIRED HYPOTHYROIDISM: ICD-10-CM

## 2020-06-16 DIAGNOSIS — Z11.4 ENCOUNTER FOR SCREENING FOR HIV: ICD-10-CM

## 2020-06-16 DIAGNOSIS — E55.9 VITAMIN D DEFICIENCY: ICD-10-CM

## 2020-06-16 DIAGNOSIS — G43.109 MIGRAINE WITH AURA AND WITHOUT STATUS MIGRAINOSUS, NOT INTRACTABLE: ICD-10-CM

## 2020-06-16 DIAGNOSIS — Z12.31 ENCOUNTER FOR SCREENING MAMMOGRAM FOR BREAST CANCER: Primary | ICD-10-CM

## 2020-06-16 PROBLEM — L03.012 CELLULITIS OF FINGER OF LEFT HAND: Status: RESOLVED | Noted: 2019-11-12 | Resolved: 2020-06-16

## 2020-06-16 PROCEDURE — 99214 OFFICE O/P EST MOD 30 MIN: CPT | Performed by: INTERNAL MEDICINE

## 2020-06-16 PROCEDURE — 1036F TOBACCO NON-USER: CPT | Performed by: INTERNAL MEDICINE

## 2020-06-16 PROCEDURE — 3008F BODY MASS INDEX DOCD: CPT | Performed by: INTERNAL MEDICINE

## 2020-06-16 RX ORDER — SUMATRIPTAN 50 MG/1
50 TABLET, FILM COATED ORAL ONCE AS NEEDED
Qty: 9 TABLET | Refills: 3 | Status: SHIPPED | OUTPATIENT
Start: 2020-06-16 | End: 2020-06-16 | Stop reason: SDUPTHER

## 2020-06-16 RX ORDER — SUMATRIPTAN 50 MG/1
50 TABLET, FILM COATED ORAL ONCE AS NEEDED
Qty: 9 TABLET | Refills: 3 | Status: SHIPPED | OUTPATIENT
Start: 2020-06-16

## 2020-06-16 RX ORDER — LEVOTHYROXINE SODIUM 0.05 MG/1
50 TABLET ORAL DAILY
Qty: 90 TABLET | Refills: 1 | Status: SHIPPED | OUTPATIENT
Start: 2020-06-16 | End: 2021-01-20 | Stop reason: SDUPTHER

## 2020-12-31 ENCOUNTER — TELEPHONE (OUTPATIENT)
Dept: ADMINISTRATIVE | Facility: OTHER | Age: 55
End: 2020-12-31

## 2021-01-20 ENCOUNTER — OFFICE VISIT (OUTPATIENT)
Dept: INTERNAL MEDICINE CLINIC | Age: 56
End: 2021-01-20
Payer: COMMERCIAL

## 2021-01-20 VITALS
HEART RATE: 63 BPM | OXYGEN SATURATION: 96 % | BODY MASS INDEX: 21.41 KG/M2 | HEIGHT: 64 IN | SYSTOLIC BLOOD PRESSURE: 122 MMHG | TEMPERATURE: 98.3 F | WEIGHT: 125.4 LBS | DIASTOLIC BLOOD PRESSURE: 80 MMHG

## 2021-01-20 DIAGNOSIS — G43.109 MIGRAINE WITH AURA AND WITHOUT STATUS MIGRAINOSUS, NOT INTRACTABLE: Primary | ICD-10-CM

## 2021-01-20 DIAGNOSIS — E03.9 ACQUIRED HYPOTHYROIDISM: ICD-10-CM

## 2021-01-20 DIAGNOSIS — E55.9 VITAMIN D DEFICIENCY: ICD-10-CM

## 2021-01-20 PROCEDURE — 1036F TOBACCO NON-USER: CPT | Performed by: INTERNAL MEDICINE

## 2021-01-20 PROCEDURE — 3008F BODY MASS INDEX DOCD: CPT | Performed by: INTERNAL MEDICINE

## 2021-01-20 PROCEDURE — 99214 OFFICE O/P EST MOD 30 MIN: CPT | Performed by: INTERNAL MEDICINE

## 2021-01-20 RX ORDER — LEVOTHYROXINE SODIUM 0.05 MG/1
50 TABLET ORAL DAILY
Qty: 90 TABLET | Refills: 1 | Status: SHIPPED | OUTPATIENT
Start: 2021-01-20 | End: 2021-07-14 | Stop reason: SDUPTHER

## 2021-01-20 NOTE — PROGRESS NOTES
Assessment/Plan:     Diagnoses and all orders for this visit:    Migraine with aura and without status migrainosus, not intractable    Acquired hypothyroidism  -     levothyroxine 50 mcg tablet; Take 1 tablet (50 mcg total) by mouth daily    Vitamin D deficiency             Subjective:   Chief Complaint   Patient presents with    Follow-up    Hypothyroidism        Patient ID: Rula Manjarrez is a 60 y o  female  This is a very pleasant 60 years young lady who is here today for the regular follow-up her blood workup and thyroid functions were all within normal range 2 problems are the shoulder problems which is not as bad as before but she did not go for the physical therapy I recommend her if the shoulder pain get worse than she should take the physical therapy and then follow-up with the orthopedic surgeon if needed  Also trigger finger right hand used to be middle 1 which is better now in the ring finger I recommend physical therapy and the exercises I do not see any reason to put injection of cortisone into the finger right now I told her if this is unbearable and the pain is significant and I can give her the injection  Hypercholesterolemia cholesterol levels are better than before continue to follow will follow her up in 6 months unless any problem  Her migraines are better than before she does not use much of the sumatriptan      The following portions of the patient's history were reviewed and updated as appropriate: allergies, current medications, past family history, past medical history, past social history, past surgical history and problem list        Review of Systems   Constitutional: Negative for chills and fatigue  HENT: Negative for congestion, ear pain, hearing loss, postnasal drip, sinus pressure, sore throat and voice change  Eyes: Negative for pain, discharge and visual disturbance  Respiratory: Negative for cough, chest tightness and shortness of breath      Cardiovascular: Negative for chest pain, palpitations and leg swelling  Gastrointestinal: Negative for abdominal pain, blood in stool, diarrhea, nausea and rectal pain  Genitourinary: Negative for difficulty urinating, dysuria and urgency  Musculoskeletal: Negative for arthralgias and joint swelling  Shoulder pain she was seen by the orthopedic an injection was given still having some more pain in the shoulder specially the left 1  Also complaining of trigger finger on the right hand this is mostly on ring finger she was having problem with the middle finger but that is better   Skin: Negative for rash  Allergic/Immunologic: Negative for environmental allergies and food allergies  Neurological: Negative for dizziness, tremors, weakness, numbness and headaches  Hematological: Negative for adenopathy  Psychiatric/Behavioral: Negative for behavioral problems and hallucinations           Past Medical History:   Diagnosis Date    Allergic rhinitis due to pollen     last assessed 11/11/13    Asthma     Common migraine without aura     last assessed 06/03/14    Seasonal allergies     last assessed 11/11/13         Current Outpatient Medications:     calcium carbonate-vitamin D (OSCAL-D) 500 mg-200 units per tablet, Take 1 tablet by mouth daily with breakfast, Disp: 30 tablet, Rfl: 5    Cetirizine HCl (ZYRTEC PO), Take by mouth as needed, Disp: , Rfl:     cholecalciferol (VITAMIN D3) 1,000 units tablet, Take 1 tablet (1,000 Units total) by mouth daily, Disp: 30 tablet, Rfl: 5    hydrOXYzine HCL (ATARAX) 25 mg tablet, Take 25 mg by mouth as needed  , Disp: , Rfl:     levothyroxine 50 mcg tablet, Take 1 tablet (50 mcg total) by mouth daily, Disp: 90 tablet, Rfl: 1    naproxen sodium (ALEVE) 220 MG tablet, Take 220 mg by mouth as needed for mild pain, Disp: , Rfl:     SUMAtriptan (IMITREX) 50 mg tablet, Take 1 tablet (50 mg total) by mouth once as needed for migraine for up to 1 dose, Disp: 9 tablet, Rfl: 3   triamcinolone (KENALOG) 0 5 % ointment, Apply topically 2 (two) times a day, Disp: , Rfl:     Allergies   Allergen Reactions    Seasonal Ic [Cholestatin] Nasal Congestion       Social History   Past Surgical History:   Procedure Laterality Date    CARPAL TUNNEL RELEASE Bilateral 2003    TONSILLECTOMY       Family History   Problem Relation Age of Onset    Coronary artery disease Mother     Diabetes Mother         in her 42's    Hypertension Mother     Other Mother         2 children living    Heart failure Mother     Colon cancer Father        Objective:  /80 (BP Location: Left arm, Patient Position: Sitting, Cuff Size: Adult)   Pulse 63   Temp 98 3 °F (36 8 °C) (Temporal)   Ht 5' 3 98" (1 625 m)   Wt 56 9 kg (125 lb 6 4 oz)   SpO2 96%   BMI 21 54 kg/m²        Physical Exam  Vitals signs and nursing note reviewed  Constitutional:       Appearance: She is well-developed  HENT:      Right Ear: External ear normal    Eyes:      Conjunctiva/sclera: Conjunctivae normal       Pupils: Pupils are equal, round, and reactive to light  Neck:      Musculoskeletal: Normal range of motion  Thyroid: No thyromegaly  Vascular: No JVD  Cardiovascular:      Rate and Rhythm: Normal rate and regular rhythm  Heart sounds: Normal heart sounds  Pulmonary:      Breath sounds: Normal breath sounds  Abdominal:      General: Bowel sounds are normal       Palpations: Abdomen is soft  Musculoskeletal: Normal range of motion  Skin:     General: Skin is dry  Neurological:      Mental Status: She is alert and oriented to person, place, and time  Deep Tendon Reflexes: Reflexes are normal and symmetric     Psychiatric:         Behavior: Behavior normal

## 2021-04-07 DIAGNOSIS — Z23 ENCOUNTER FOR IMMUNIZATION: ICD-10-CM

## 2021-07-08 ENCOUNTER — RA CDI HCC (OUTPATIENT)
Dept: OTHER | Facility: HOSPITAL | Age: 56
End: 2021-07-08

## 2021-07-08 NOTE — PROGRESS NOTES
[facility] 73  coding opportunities          Chart reviewed, no opportunity found: CHART REVIEWED, NO OPPORTUNITY FOUND                     Patients insurance company: [employer] (Medicare Advantage and Commercial)

## 2021-07-14 ENCOUNTER — OFFICE VISIT (OUTPATIENT)
Dept: INTERNAL MEDICINE CLINIC | Age: 56
End: 2021-07-14
Payer: COMMERCIAL

## 2021-07-14 VITALS
TEMPERATURE: 98.2 F | BODY MASS INDEX: 22.02 KG/M2 | WEIGHT: 129 LBS | HEIGHT: 64 IN | HEART RATE: 72 BPM | SYSTOLIC BLOOD PRESSURE: 126 MMHG | DIASTOLIC BLOOD PRESSURE: 82 MMHG | OXYGEN SATURATION: 100 %

## 2021-07-14 DIAGNOSIS — E55.9 VITAMIN D DEFICIENCY: ICD-10-CM

## 2021-07-14 DIAGNOSIS — Z11.4 ENCOUNTER FOR SCREENING FOR HUMAN IMMUNODEFICIENCY VIRUS (HIV): ICD-10-CM

## 2021-07-14 DIAGNOSIS — E03.9 ACQUIRED HYPOTHYROIDISM: Primary | ICD-10-CM

## 2021-07-14 DIAGNOSIS — Z11.59 ENCOUNTER FOR HEPATITIS C SCREENING TEST FOR LOW RISK PATIENT: ICD-10-CM

## 2021-07-14 PROCEDURE — 99214 OFFICE O/P EST MOD 30 MIN: CPT | Performed by: INTERNAL MEDICINE

## 2021-07-14 RX ORDER — LEVOTHYROXINE SODIUM 0.05 MG/1
50 TABLET ORAL DAILY
Qty: 90 TABLET | Refills: 1 | Status: SHIPPED | OUTPATIENT
Start: 2021-07-14 | End: 2022-03-14 | Stop reason: SDUPTHER

## 2021-07-14 RX ORDER — LIDOCAINE HYDROCHLORIDE 10 MG/ML
1 INJECTION, SOLUTION INFILTRATION; PERINEURAL
COMMUNITY
End: 2021-07-14 | Stop reason: ALTCHOICE

## 2021-07-14 RX ORDER — LEVOTHYROXINE SODIUM 0.05 MG/1
50 TABLET ORAL DAILY
Qty: 90 TABLET | Refills: 1 | Status: SHIPPED | OUTPATIENT
Start: 2021-07-14 | End: 2021-07-14 | Stop reason: SDUPTHER

## 2021-07-14 NOTE — PROGRESS NOTES
Assessment/Plan:    No problem-specific Assessment & Plan notes found for this encounter  Diagnoses and all orders for this visit:    Acquired hypothyroidism  Recent TSH within normal limits   -     TSH, 3rd generation with Free T4 reflex; Future  -     Comprehensive metabolic panel; Future  -     levothyroxine 50 mcg tablet; Take 1 tablet (50 mcg total) by mouth daily    Hypercholesterolemia  Calculated ASCVD risk 1 8%  No statin recommended at this time  Encourage healthy cardiovascular lifestyle       -     Lipid Panel with Direct LDL reflex; Future    Vitamin D deficiency  -     Vitamin D 25 hydroxy; Future    Encounter for screening for human immunodeficiency virus (HIV)       -     HIV 1/2 Antigen/Antibody (4th Generation) w Reflex [facility]; Future    Encounter for hepatitis C screening test for low risk patient  -     Hepatitis C antibody; Future        Follow-up in 6 months    Subjective:      Patient ID: Miguel Angel Morales is a 60 y o  female  HPI    Pleasant 60years old patient with past medical history of hypothyroidism, migraine, calcific tendinitis of the shoulder and rotator cuff tendinosis presented for follow-up  She is doing overall well  She follows with Orthopedics for her shoulder pain and receives regular injections  She is also doing therapy at home  Migraines are well controlled, she was weaned off of propranolol and has not used much of the sumatriptan recently  No acute complaints today  She is up-to-date with mammogram, Pap smears and colonoscopy  The following portions of the patient's history were reviewed and updated as appropriate: allergies, current medications, past family history, past medical history, past social history, past surgical history and problem list     Review of Systems   Constitutional: Negative for appetite change, chills and fever  Respiratory: Negative for cough, shortness of breath and wheezing      Cardiovascular: Negative for chest pain, palpitations and leg swelling  Gastrointestinal: Negative for abdominal pain, nausea and vomiting  Musculoskeletal: Positive for arthralgias (Shoulder pain)  Neurological: Negative for dizziness, weakness and headaches  Past Medical History:   Diagnosis Date    Allergic rhinitis due to pollen     last assessed 11/11/13    Asthma     Common migraine without aura     last assessed 06/03/14    Seasonal allergies     last assessed 11/11/13     Past Surgical History:   Procedure Laterality Date    CARPAL TUNNEL RELEASE Bilateral 2003    TONSILLECTOMY         Current Outpatient Medications   Medication Instructions    calcium carbonate-vitamin D (OSCAL-D) 500 mg-200 units per tablet 1 tablet, Oral, Daily with breakfast    Cetirizine HCl (ZYRTEC PO) Oral, As needed    cholecalciferol (VITAMIN D3) 1,000 Units, Oral, Daily    hydrOXYzine HCL (ATARAX) 25 mg, Oral, As needed    levothyroxine 50 mcg, Oral, Daily    SUMAtriptan (IMITREX) 50 mg, Oral, Once as needed       Objective:      /82 (BP Location: Left arm, Patient Position: Sitting, Cuff Size: Standard)   Pulse 72   Temp 98 2 °F (36 8 °C) (Temporal)   Ht 5' 4" (1 626 m)   Wt 58 5 kg (129 lb)   SpO2 100%   BMI 22 14 kg/m²          Physical Exam  Vitals and nursing note reviewed  Constitutional:       General: She is not in acute distress  Appearance: Normal appearance  She is not ill-appearing or toxic-appearing  HENT:      Head: Normocephalic and atraumatic  Eyes:      Conjunctiva/sclera: Conjunctivae normal    Cardiovascular:      Rate and Rhythm: Normal rate and regular rhythm  Heart sounds: Normal heart sounds  No murmur heard  Pulmonary:      Effort: Pulmonary effort is normal  No respiratory distress  Breath sounds: Normal breath sounds  No wheezing  Abdominal:      General: There is no distension  Palpations: Abdomen is soft  Tenderness: There is no abdominal tenderness     Musculoskeletal: General: Normal range of motion  Cervical back: Normal range of motion  Skin:     General: Skin is warm  Neurological:      General: No focal deficit present  Mental Status: She is alert and oriented to person, place, and time               Simon Mendoza MD  Internal Medicine Residency   PGY-3

## 2022-01-05 LAB
EXTERNAL HIV CONFIRMATION: NORMAL
EXTERNAL HIV SCREEN: NORMAL
HCV AB SER-ACNC: NEGATIVE

## 2022-03-14 ENCOUNTER — OFFICE VISIT (OUTPATIENT)
Dept: INTERNAL MEDICINE CLINIC | Age: 57
End: 2022-03-14
Payer: COMMERCIAL

## 2022-03-14 VITALS
TEMPERATURE: 97.7 F | WEIGHT: 132.1 LBS | HEART RATE: 67 BPM | BODY MASS INDEX: 22.55 KG/M2 | OXYGEN SATURATION: 99 % | SYSTOLIC BLOOD PRESSURE: 130 MMHG | HEIGHT: 64 IN | DIASTOLIC BLOOD PRESSURE: 80 MMHG

## 2022-03-14 DIAGNOSIS — E03.9 ACQUIRED HYPOTHYROIDISM: ICD-10-CM

## 2022-03-14 DIAGNOSIS — G89.29 CHRONIC LEFT SHOULDER PAIN: ICD-10-CM

## 2022-03-14 DIAGNOSIS — E55.9 VITAMIN D DEFICIENCY: ICD-10-CM

## 2022-03-14 DIAGNOSIS — G43.109 MIGRAINE WITH AURA AND WITHOUT STATUS MIGRAINOSUS, NOT INTRACTABLE: Primary | ICD-10-CM

## 2022-03-14 DIAGNOSIS — M25.512 CHRONIC LEFT SHOULDER PAIN: ICD-10-CM

## 2022-03-14 PROCEDURE — 3008F BODY MASS INDEX DOCD: CPT | Performed by: INTERNAL MEDICINE

## 2022-03-14 PROCEDURE — 1036F TOBACCO NON-USER: CPT | Performed by: INTERNAL MEDICINE

## 2022-03-14 PROCEDURE — 3725F SCREEN DEPRESSION PERFORMED: CPT | Performed by: INTERNAL MEDICINE

## 2022-03-14 PROCEDURE — 99214 OFFICE O/P EST MOD 30 MIN: CPT | Performed by: INTERNAL MEDICINE

## 2022-03-14 RX ORDER — MULTIVITAMIN WITH IRON
TABLET ORAL
COMMUNITY

## 2022-03-14 RX ORDER — LEVOTHYROXINE SODIUM 0.05 MG/1
50 TABLET ORAL DAILY
Qty: 90 TABLET | Refills: 1 | Status: SHIPPED | OUTPATIENT
Start: 2022-03-14

## 2022-03-14 NOTE — PROGRESS NOTES
Assessment/Plan:     Diagnoses and all orders for this visit:    Migraine with aura and without status migrainosus, not intractable  Migraine pains are stable off and on she takes her Imitrex but she is not on any medication for prevention  Acquired hypothyroidism  -     levothyroxine 50 mcg tablet; Take 1 tablet (50 mcg total) by mouth daily  TSH is normal  Vitamin D deficiency  Vitamin-D deficiency is now improved  Chronic left shoulder pain  Continue to follow-up with orthopedics  Other orders  -     pyridoxine (VITAMIN B6) 100 mg tablet; Vitamin B6      Regarding her cholesterol her lipid panel shows the total cholesterol is high good cholesterol is excellent and the LDL cholesterol is  high we will follow it up no treatment recommended at this point       M*Modal software was used to dictate this note  It may contain errors with dictating incorrect words or incorrect spelling  Please contact the provider directly with any questions  Subjective:   Chief Complaint   Patient presents with    Follow-up     Follow up chronic conditions  Labs done 1/5/22   Health Maintenance     Pap due 3/20/22  She will shcedule  physical due  Patient ID: Viv Collins is a 60 y o  female  This is very pleasant 60 years young lady who is here today for the regular follow-up complaining of left shoulder pain an MRI was done at orthopedic surgery she had a multiple injection physical therapy but she is still have the pain mostly at night when she goes to bed or she sleep on that side there is no swelling or redness also because of the physical therapy she gets the pain in her hands there is no swelling of the joints no fever or chills  I think she should continue with her physical therapy at home and takes something for pain at night because the physical examination was essentially unremarkable full range of motion but the at the end of the motion , little bit pain          The following portions of the patient's history were reviewed and updated as appropriate: allergies, current medications, past family history, past medical history, past social history, past surgical history and problem list     Review of Systems   Constitutional: Negative for chills and fatigue  HENT: Negative for congestion, ear pain, hearing loss, postnasal drip, sinus pressure, sore throat and voice change  Eyes: Negative for pain, discharge and visual disturbance  Respiratory: Negative for cough, chest tightness and shortness of breath  Cardiovascular: Negative for chest pain, palpitations and leg swelling  Gastrointestinal: Negative for abdominal pain, blood in stool, diarrhea, nausea and rectal pain  Genitourinary: Negative for difficulty urinating, dysuria and urgency  Musculoskeletal: Positive for neck stiffness (Left shoulder pain patient was seen by the orthopedic at [facility], MRI and the x-rays were done calcific tendinitis was diagnosed also complaining of pain in both)  Negative for arthralgias and joint swelling  Skin: Negative for rash  Allergic/Immunologic: Negative for environmental allergies and food allergies  Neurological: Negative for dizziness, tremors, weakness, numbness and headaches  Hematological: Negative for adenopathy  Psychiatric/Behavioral: Negative for behavioral problems and hallucinations           Past Medical History:   Diagnosis Date    Allergic rhinitis due to pollen     last assessed 11/11/13    Asthma     Common migraine without aura     last assessed 06/03/14    Seasonal allergies     last assessed 11/11/13         Current Outpatient Medications:     calcium carbonate-vitamin D (OSCAL-D) 500 mg-200 units per tablet, Take 1 tablet by mouth daily with breakfast, Disp: 30 tablet, Rfl: 5    Cetirizine HCl (ZYRTEC PO), Take by mouth as needed, Disp: , Rfl:     cholecalciferol (VITAMIN D3) 1,000 units tablet, Take 1 tablet (1,000 Units total) by mouth daily, Disp: 30 tablet, Rfl: 5    levothyroxine 50 mcg tablet, Take 1 tablet (50 mcg total) by mouth daily, Disp: 90 tablet, Rfl: 1    SUMAtriptan (IMITREX) 50 mg tablet, Take 1 tablet (50 mg total) by mouth once as needed for migraine for up to 1 dose, Disp: 9 tablet, Rfl: 3    pyridoxine (VITAMIN B6) 100 mg tablet, Vitamin B6, Disp: , Rfl:     Allergies   Allergen Reactions    Seasonal Ic [Cholestatin] Nasal Congestion       Social History   Past Surgical History:   Procedure Laterality Date    CARPAL TUNNEL RELEASE Bilateral 2003    TONSILLECTOMY       Family History   Problem Relation Age of Onset    Coronary artery disease Mother     Diabetes Mother         in her 42's    Hypertension Mother     Other Mother         2 children living    Heart failure Mother     Colon cancer Father        Objective:  /80 (BP Location: Left arm, Patient Position: Sitting, Cuff Size: Adult)   Pulse 67   Temp 97 7 °F (36 5 °C) (Temporal)   Ht 5' 4" (1 626 m)   Wt 59 9 kg (132 lb 1 6 oz)   SpO2 99% Comment: room air  BMI 22 67 kg/m²        Physical Exam  Constitutional:       Appearance: She is well-developed  HENT:      Right Ear: External ear normal    Eyes:      Conjunctiva/sclera: Conjunctivae normal       Pupils: Pupils are equal, round, and reactive to light  Neck:      Thyroid: No thyromegaly  Vascular: No JVD  Cardiovascular:      Rate and Rhythm: Normal rate and regular rhythm  Heart sounds: Normal heart sounds  Pulmonary:      Breath sounds: Normal breath sounds  Abdominal:      General: Bowel sounds are normal       Palpations: Abdomen is soft  Musculoskeletal:         General: Normal range of motion  Left shoulder: Crepitus present  Normal range of motion  Normal strength  Normal pulse  Cervical back: Normal range of motion  Lymphadenopathy:      Cervical: No cervical adenopathy  Skin:     General: Skin is dry     Neurological:      Mental Status: She is alert and oriented to person, place, and time  Deep Tendon Reflexes: Reflexes are normal and symmetric     Psychiatric:         Behavior: Behavior normal

## 2022-09-13 ENCOUNTER — TELEPHONE (OUTPATIENT)
Dept: ADMINISTRATIVE | Facility: OTHER | Age: 57
End: 2022-09-13

## 2022-09-14 ENCOUNTER — OFFICE VISIT (OUTPATIENT)
Dept: INTERNAL MEDICINE CLINIC | Age: 57
End: 2022-09-14
Payer: COMMERCIAL

## 2022-09-14 VITALS
WEIGHT: 132 LBS | TEMPERATURE: 98.2 F | OXYGEN SATURATION: 97 % | DIASTOLIC BLOOD PRESSURE: 64 MMHG | HEART RATE: 73 BPM | SYSTOLIC BLOOD PRESSURE: 104 MMHG | BODY MASS INDEX: 22.53 KG/M2 | HEIGHT: 64 IN

## 2022-09-14 DIAGNOSIS — E03.9 ACQUIRED HYPOTHYROIDISM: ICD-10-CM

## 2022-09-14 DIAGNOSIS — L30.9 DERMATITIS: Primary | ICD-10-CM

## 2022-09-14 DIAGNOSIS — J30.1 SEASONAL ALLERGIC RHINITIS DUE TO POLLEN: ICD-10-CM

## 2022-09-14 DIAGNOSIS — Z13.228 SCREENING FOR METABOLIC DISORDER: ICD-10-CM

## 2022-09-14 DIAGNOSIS — G43.109 MIGRAINE WITH AURA AND WITHOUT STATUS MIGRAINOSUS, NOT INTRACTABLE: ICD-10-CM

## 2022-09-14 PROCEDURE — 99214 OFFICE O/P EST MOD 30 MIN: CPT | Performed by: PHYSICIAN ASSISTANT

## 2022-09-14 RX ORDER — LEVOTHYROXINE SODIUM 0.05 MG/1
50 TABLET ORAL DAILY
Qty: 90 TABLET | Refills: 1 | Status: SHIPPED | OUTPATIENT
Start: 2022-09-14

## 2022-09-14 RX ORDER — FLUTICASONE PROPIONATE 50 MCG
1 SPRAY, SUSPENSION (ML) NASAL DAILY
Qty: 16 G | Refills: 0 | Status: SHIPPED | OUTPATIENT
Start: 2022-09-14

## 2022-09-14 RX ORDER — TRIAMCINOLONE ACETONIDE 5 MG/G
CREAM TOPICAL 2 TIMES DAILY
Qty: 15 G | Refills: 0 | Status: SHIPPED | OUTPATIENT
Start: 2022-09-14

## 2022-09-14 NOTE — PROGRESS NOTES
Assessment/Plan:         Diagnoses and all orders for this visit:    Dermatitis  Comments:  triamcinolone prn  Orders:  -     triamcinolone (KENALOG) 0 5 % cream; Apply topically 2 (two) times a day  -     CBC and differential; Future  -     Comprehensive metabolic panel; Future  -     Lipid panel; Future  -     TSH, 3rd generation; Future    Acquired hypothyroidism  Comments:  tsh stable  f/u labs in 6 months  continue current dose levothyroxine  Orders:  -     levothyroxine 50 mcg tablet; Take 1 tablet (50 mcg total) by mouth daily  -     CBC and differential; Future  -     Comprehensive metabolic panel; Future  -     Lipid panel; Future  -     TSH, 3rd generation; Future    Screening for metabolic disorder  -     CBC and differential; Future  -     Comprehensive metabolic panel; Future  -     Lipid panel; Future  -     TSH, 3rd generation; Future    Seasonal allergic rhinitis due to pollen  Comments:  continue zyrtec  allergy shots    Orders:  -     CBC and differential; Future  -     Comprehensive metabolic panel; Future  -     Lipid panel; Future  -     TSH, 3rd generation; Future  -     fluticasone (FLONASE) 50 mcg/act nasal spray; 1 spray into each nostril daily    Migraine with aura and without status migrainosus, not intractable  -     CBC and differential; Future  -     Comprehensive metabolic panel; Future  -     Lipid panel; Future  -     TSH, 3rd generation; Future        Monitor skin change on face, macule, if changes or gets larger recommend derm referral, ok to monitor now    Subjective:      Patient ID: Niru Nunez is a 60 y o  female      61 y/o female with hx of allergic rhinitis, hypothyroidism presents for f/u  C/o blocked ears since Monday when she [place]  Noticed worsening hearing    C/o rash on L palm, used triamcinolone in past but does not have any left currently    C/o flat color change on skin noted this summer  Not itchy or painful    C/o nodules on fingers - R hand, dominant         The following portions of the patient's history were reviewed and updated as appropriate: allergies, current medications, past family history, past medical history, past social history, past surgical history and problem list     Review of Systems   Constitutional: Negative for activity change, appetite change, chills, diaphoresis, fatigue and fever  HENT: Negative for congestion and ear pain (pressure b/l )  Eyes: Negative for pain and redness  Respiratory: Negative for cough and shortness of breath  Cardiovascular: Negative for chest pain and leg swelling  Gastrointestinal: Negative for abdominal pain, constipation, diarrhea and nausea  Genitourinary: Negative for dysuria and flank pain  Musculoskeletal: Negative for arthralgias, back pain and gait problem  Skin: Positive for color change (lesion on L side face - flesh colored) and rash (now resolved on hand )  Neurological: Negative for dizziness, light-headedness and headaches  Psychiatric/Behavioral: Negative for sleep disturbance  The patient is not nervous/anxious            Past Medical History:   Diagnosis Date    Allergic rhinitis due to pollen     last assessed 11/11/13    Asthma     Common migraine without aura     last assessed 06/03/14    Seasonal allergies     last assessed 11/11/13         Current Outpatient Medications:     calcium carbonate-vitamin D (OSCAL-D) 500 mg-200 units per tablet, Take 1 tablet by mouth daily with breakfast, Disp: 30 tablet, Rfl: 5    Cetirizine HCl (ZYRTEC PO), Take by mouth as needed, Disp: , Rfl:     cholecalciferol (VITAMIN D3) 1,000 units tablet, Take 1 tablet (1,000 Units total) by mouth daily, Disp: 30 tablet, Rfl: 5    fluticasone (FLONASE) 50 mcg/act nasal spray, 1 spray into each nostril daily, Disp: 16 g, Rfl: 0    levothyroxine 50 mcg tablet, Take 1 tablet (50 mcg total) by mouth daily, Disp: 90 tablet, Rfl: 1    pyridoxine (VITAMIN B6) 100 mg tablet, Vitamin B6, Disp: , Rfl:     SUMAtriptan (IMITREX) 50 mg tablet, Take 1 tablet (50 mg total) by mouth once as needed for migraine for up to 1 dose, Disp: 9 tablet, Rfl: 3    triamcinolone (KENALOG) 0 5 % cream, Apply topically 2 (two) times a day, Disp: 15 g, Rfl: 0    Allergies   Allergen Reactions    Seasonal Ic [Cholestatin] Nasal Congestion       Social History   Past Surgical History:   Procedure Laterality Date    CARPAL TUNNEL RELEASE Bilateral 2003    TONSILLECTOMY       Family History   Problem Relation Age of Onset    Coronary artery disease Mother     Diabetes Mother         in her 42's    Hypertension Mother     Other Mother         2 children living    Heart failure Mother     Colon cancer Father        Objective:  /64 (BP Location: Left arm, Patient Position: Sitting, Cuff Size: Standard)   Pulse 73   Temp 98 2 °F (36 8 °C) (Temporal)   Ht 5' 4" (1 626 m)   Wt 59 9 kg (132 lb)   SpO2 97%   BMI 22 66 kg/m²        Physical Exam  Vitals reviewed  Constitutional:       General: She is not in acute distress  HENT:      Head: Normocephalic and atraumatic  Nose: Nose normal    Eyes:      General:         Right eye: No discharge  Left eye: No discharge  Extraocular Movements: Extraocular movements intact  Conjunctiva/sclera: Conjunctivae normal       Pupils: Pupils are equal, round, and reactive to light  Cardiovascular:      Rate and Rhythm: Normal rate and regular rhythm  Heart sounds: No murmur heard  Pulmonary:      Effort: Pulmonary effort is normal  No respiratory distress  Breath sounds: Normal breath sounds  No wheezing, rhonchi or rales  Abdominal:      General: Bowel sounds are normal  There is no distension  Musculoskeletal:         General: Normal range of motion  Cervical back: Normal range of motion  Right lower leg: No edema  Left lower leg: No edema  Lymphadenopathy:      Cervical: No cervical adenopathy  Skin:     Findings: Lesion (L side face macule flesh colored approx 4cm diameter, non indurated, no redness and not raised) and rash (pt reports on thenar area - now resolved) present  Neurological:      General: No focal deficit present  Mental Status: She is alert and oriented to person, place, and time     Psychiatric:         Mood and Affect: Mood normal          Behavior: Behavior normal

## 2023-03-02 ENCOUNTER — OFFICE VISIT (OUTPATIENT)
Dept: INTERNAL MEDICINE CLINIC | Age: 58
End: 2023-03-02

## 2023-03-02 VITALS
OXYGEN SATURATION: 99 % | SYSTOLIC BLOOD PRESSURE: 118 MMHG | TEMPERATURE: 96.9 F | DIASTOLIC BLOOD PRESSURE: 70 MMHG | WEIGHT: 135 LBS | HEIGHT: 64 IN | BODY MASS INDEX: 23.05 KG/M2 | HEART RATE: 71 BPM

## 2023-03-02 DIAGNOSIS — E03.9 ACQUIRED HYPOTHYROIDISM: ICD-10-CM

## 2023-03-02 DIAGNOSIS — E78.00 HYPERCHOLESTEREMIA: Primary | ICD-10-CM

## 2023-03-02 DIAGNOSIS — G43.109 MIGRAINE WITH AURA AND WITHOUT STATUS MIGRAINOSUS, NOT INTRACTABLE: ICD-10-CM

## 2023-03-02 RX ORDER — LEVOTHYROXINE SODIUM 0.05 MG/1
50 TABLET ORAL DAILY
Qty: 90 TABLET | Refills: 1 | Status: SHIPPED | OUTPATIENT
Start: 2023-03-02

## 2023-03-02 RX ORDER — SUMATRIPTAN 50 MG/1
50 TABLET, FILM COATED ORAL ONCE AS NEEDED
Qty: 9 TABLET | Refills: 3 | Status: SHIPPED | OUTPATIENT
Start: 2023-03-02

## 2023-03-02 NOTE — PROGRESS NOTES
Assessment/Plan:     Diagnoses and all orders for this visit:    Migraine with aura and without status migrainosus, not intractable  -     SUMAtriptan (IMITREX) 50 mg tablet; Take 1 tablet (50 mg total) by mouth once as needed for migraine for up to 1 dose    Acquired hypothyroidism  Comments:  tsh stable  f/u labs in 6 months  continue current dose levothyroxine  Orders:  -     levothyroxine 50 mcg tablet; Take 1 tablet (50 mcg total) by mouth daily    Medial epicondylitis recommend her the physical therapy or help with the elbow support putting ice on it that also help     History of benign positional vertigo occasional episodes of dizziness    M*Modal software was used to dictate this note  It may contain errors with dictating incorrect words or incorrect spelling  Please contact the provider directly with any questions  Subjective:   Chief Complaint   Patient presents with   • Follow-up     6 month- labs done 2/19-    • Rash     Right elbow-    • Restless Leg     Pt states she has restless leg at night        Patient ID: Benita Epps is a 59 y o  female  HPI  Is a very pleasant 59 years young lady who is here today for the regular follow-up she is doing well no new complaints The occasional episodes of dizziness she has a previous history of benign positional vertigo most of the dizziness is secondary to change in her position of the neck  Does not last longer not associated with any nausea or vomiting I recommend her that if it is persistent then let me know so that we can send her for the physical therapy    Hypothyroidism the TSH was within normal range  Migraine headaches are stable  Continue with this Imitrex as before  Medial epicondylitis as given above  The following portions of the patient's history were reviewed and updated as appropriate: allergies, current medications, past family history, past medical history, past social history, past surgical history and problem list     Review of Systems   Constitutional: Negative for chills and fatigue  HENT: Negative for congestion, ear pain, hearing loss, postnasal drip, sinus pressure, sore throat and voice change  Eyes: Negative for pain, discharge and visual disturbance  Respiratory: Negative for cough, chest tightness and shortness of breath  Cardiovascular: Negative for chest pain, palpitations and leg swelling  Gastrointestinal: Negative for abdominal pain, blood in stool, diarrhea, nausea and rectal pain  Genitourinary: Negative for difficulty urinating, dysuria and urgency  Musculoskeletal: Positive for arthralgias (Medial epicondyle pain tenderness)  Negative for joint swelling  Skin: Negative for rash  Allergic/Immunologic: Negative for environmental allergies and food allergies  Neurological: Positive for dizziness  Negative for tremors, weakness, numbness and headaches  Hematological: Negative for adenopathy  Psychiatric/Behavioral: Negative for behavioral problems and hallucinations           Past Medical History:   Diagnosis Date   • Allergic rhinitis due to pollen     last assessed 11/11/13   • Asthma    • Common migraine without aura     last assessed 06/03/14   • Seasonal allergies     last assessed 11/11/13         Current Outpatient Medications:   •  calcium carbonate-vitamin D (OSCAL-D) 500 mg-200 units per tablet, Take 1 tablet by mouth daily with breakfast, Disp: 30 tablet, Rfl: 5  •  Cetirizine HCl (ZYRTEC PO), Take by mouth as needed, Disp: , Rfl:   •  cholecalciferol (VITAMIN D3) 1,000 units tablet, Take 1 tablet (1,000 Units total) by mouth daily, Disp: 30 tablet, Rfl: 5  •  fluticasone (FLONASE) 50 mcg/act nasal spray, 1 spray into each nostril daily, Disp: 16 g, Rfl: 0  •  levothyroxine 50 mcg tablet, Take 1 tablet (50 mcg total) by mouth daily, Disp: 90 tablet, Rfl: 1  •  pyridoxine (VITAMIN B6) 100 mg tablet, Vitamin B6, Disp: , Rfl:   •  SUMAtriptan (IMITREX) 50 mg tablet, Take 1 tablet (50 mg total) by mouth once as needed for migraine for up to 1 dose, Disp: 9 tablet, Rfl: 3  •  triamcinolone (KENALOG) 0 5 % cream, Apply topically 2 (two) times a day, Disp: 15 g, Rfl: 0    Allergies   Allergen Reactions   • Seasonal Ic [Cholestatin] Nasal Congestion       Social History   Past Surgical History:   Procedure Laterality Date   • CARPAL TUNNEL RELEASE Bilateral 2003   • TONSILLECTOMY       Family History   Problem Relation Age of Onset   • Coronary artery disease Mother    • Diabetes Mother         in her 42's   • Hypertension Mother    • Other Mother         2 children living   • Heart failure Mother    • Heart disease Mother    • Colon cancer Father    • Thyroid disease Father        Objective:  /70 (BP Location: Left arm, Patient Position: Sitting, Cuff Size: Standard)   Pulse 71   Temp (!) 96 9 °F (36 1 °C) (Temporal)   Ht 5' 4" (1 626 m)   Wt 61 2 kg (135 lb)   SpO2 99% Comment: room air  BMI 23 17 kg/m²        Physical Exam  Constitutional:       Appearance: She is well-developed  HENT:      Right Ear: External ear normal    Eyes:      Conjunctiva/sclera: Conjunctivae normal       Pupils: Pupils are equal, round, and reactive to light  Neck:      Thyroid: No thyromegaly  Vascular: No JVD  Cardiovascular:      Rate and Rhythm: Normal rate and regular rhythm  Heart sounds: Normal heart sounds  Pulmonary:      Breath sounds: Normal breath sounds  Abdominal:      General: Bowel sounds are normal       Palpations: Abdomen is soft  Musculoskeletal:         General: Normal range of motion  Cervical back: Normal range of motion  Lymphadenopathy:      Cervical: No cervical adenopathy  Skin:     General: Skin is dry  Neurological:      Mental Status: She is alert and oriented to person, place, and time  Deep Tendon Reflexes: Reflexes are normal and symmetric     Psychiatric:         Behavior: Behavior normal

## 2023-03-15 ENCOUNTER — TELEPHONE (OUTPATIENT)
Dept: ADMINISTRATIVE | Facility: OTHER | Age: 58
End: 2023-03-15

## 2023-03-15 NOTE — TELEPHONE ENCOUNTER
Upon review of the In Basket request we were able to locate, review, and update the patient chart as requested for CRC: Colonoscopy  Any additional questions or concerns should be emailed to the Practice Liaisons via the appropriate education email address, please do not reply via In Basket  Thank you  Reji Young         03/15/23 12:40 PM     [facility] [employer] SmartForm used to document caregap status      Reji Young

## 2023-06-12 ENCOUNTER — OFFICE VISIT (OUTPATIENT)
Dept: INTERNAL MEDICINE CLINIC | Facility: CLINIC | Age: 58
End: 2023-06-12
Payer: COMMERCIAL

## 2023-06-12 VITALS
WEIGHT: 131.6 LBS | BODY MASS INDEX: 22.47 KG/M2 | HEART RATE: 69 BPM | HEIGHT: 64 IN | SYSTOLIC BLOOD PRESSURE: 140 MMHG | OXYGEN SATURATION: 98 % | DIASTOLIC BLOOD PRESSURE: 94 MMHG | TEMPERATURE: 98.2 F

## 2023-06-12 DIAGNOSIS — Z12.31 ENCOUNTER FOR SCREENING MAMMOGRAM FOR MALIGNANT NEOPLASM OF BREAST: Primary | ICD-10-CM

## 2023-06-12 DIAGNOSIS — L23.9 ALLERGIC CONTACT DERMATITIS, UNSPECIFIED TRIGGER: ICD-10-CM

## 2023-06-12 PROCEDURE — 99213 OFFICE O/P EST LOW 20 MIN: CPT | Performed by: NURSE PRACTITIONER

## 2023-06-12 RX ORDER — PREDNISONE 10 MG/1
TABLET ORAL
Qty: 30 TABLET | Refills: 0 | Status: SHIPPED | OUTPATIENT
Start: 2023-06-12 | End: 2023-06-24

## 2023-06-12 NOTE — ASSESSMENT & PLAN NOTE
Will start prednisone taper  Contact dermatitis caused by poison  Use cold compresses to help with itching, take cold showers to prevent from further itching  May take Benadryl at night as it may make you drowsy to help with itching  Patient is to report back to the office if his rash gets worse  Discussed red flag symptoms with patient when to report to the emergency room

## 2023-06-12 NOTE — PROGRESS NOTES
Assessment/Plan:    Allergic contact dermatitis  Will start prednisone taper  Contact dermatitis caused by poison  Use cold compresses to help with itching, take cold showers to prevent from further itching  May take Benadryl at night as it may make you drowsy to help with itching  Patient is to report back to the office if his rash gets worse  Discussed red flag symptoms with patient when to report to the emergency room  Diagnoses and all orders for this visit:    Encounter for screening mammogram for malignant neoplasm of breast  -     Mammo screening bilateral w 3d & cad    Allergic contact dermatitis, unspecified trigger  -     predniSONE 10 mg tablet; Take 4 tablets (40 mg total) by mouth daily for 3 days, THEN 3 tablets (30 mg total) daily for 3 days, THEN 2 tablets (20 mg total) daily for 3 days, THEN 1 tablet (10 mg total) daily for 3 days  Subjective:      Patient ID: Julianne Villalobos is a 59 y o  female  Patient presents today with a rash, which she feels may be poison ivy  Rash  This is a new problem  The current episode started in the past 7 days  The rash is diffuse  The rash is characterized by blistering and itchiness  She was exposed to poison ivy/oak  Associated symptoms include itching  Pertinent negatives include no cough, diarrhea, facial edema, fatigue, fever, shortness of breath, sore throat or vomiting  Past treatments include antihistamine and anti-itch cream  The treatment provided no relief  The following portions of the patient's history were reviewed and updated as appropriate: allergies, current medications, past family history, past medical history, past social history, past surgical history and problem list     Review of Systems   Constitutional: Negative for chills, fatigue and fever  HENT: Negative for sore throat  Respiratory: Negative for cough, chest tightness, shortness of breath and wheezing      Cardiovascular: Negative for chest pain, palpitations and leg swelling  Gastrointestinal: Negative for abdominal pain, constipation, diarrhea, nausea and vomiting  Genitourinary: Negative for difficulty urinating, dysuria and urgency  Skin: Positive for itching and rash  Neurological: Negative for dizziness, weakness, numbness and headaches  Past Medical History:   Diagnosis Date   • Allergic rhinitis due to pollen     last assessed 11/11/13   • Asthma    • Common migraine without aura     last assessed 06/03/14   • Seasonal allergies     last assessed 11/11/13         Current Outpatient Medications:   •  calcium carbonate-vitamin D (OSCAL-D) 500 mg-200 units per tablet, Take 1 tablet by mouth daily with breakfast, Disp: 30 tablet, Rfl: 5  •  Cetirizine HCl (ZYRTEC PO), Take by mouth as needed, Disp: , Rfl:   •  cholecalciferol (VITAMIN D3) 1,000 units tablet, Take 1 tablet (1,000 Units total) by mouth daily, Disp: 30 tablet, Rfl: 5  •  fluticasone (FLONASE) 50 mcg/act nasal spray, 1 spray into each nostril daily, Disp: 16 g, Rfl: 0  •  levothyroxine 50 mcg tablet, Take 1 tablet (50 mcg total) by mouth daily, Disp: 90 tablet, Rfl: 1  •  predniSONE 10 mg tablet, Take 4 tablets (40 mg total) by mouth daily for 3 days, THEN 3 tablets (30 mg total) daily for 3 days, THEN 2 tablets (20 mg total) daily for 3 days, THEN 1 tablet (10 mg total) daily for 3 days  , Disp: 30 tablet, Rfl: 0  •  pyridoxine (VITAMIN B6) 100 mg tablet, Vitamin B6, Disp: , Rfl:   •  SUMAtriptan (IMITREX) 50 mg tablet, Take 1 tablet (50 mg total) by mouth once as needed for migraine for up to 1 dose, Disp: 9 tablet, Rfl: 3  •  triamcinolone (KENALOG) 0 5 % cream, Apply topically 2 (two) times a day, Disp: 15 g, Rfl: 0    Allergies   Allergen Reactions   • Seasonal Ic [Cholestatin] Nasal Congestion       Social History   Past Surgical History:   Procedure Laterality Date   • CARPAL TUNNEL RELEASE Bilateral 2003   • TONSILLECTOMY       Family History   Problem "Relation Age of Onset   • Coronary artery disease Mother    • Diabetes Mother         in her 42's   • Hypertension Mother    • Other Mother         2 children living   • Heart failure Mother    • Heart disease Mother    • Colon cancer Father    • Thyroid disease Father        Objective:  /94 (BP Location: Left arm, Patient Position: Sitting, Cuff Size: Standard)   Pulse 69   Temp 98 2 °F (36 8 °C) (Temporal)   Ht 5' 3 98\" (1 625 m)   Wt 59 7 kg (131 lb 9 6 oz)   SpO2 98%   BMI 22 61 kg/m²     No results found for this or any previous visit (from the past 1344 hour(s))  Physical Exam  Constitutional:       General: She is not in acute distress  Appearance: She is well-developed  She is not diaphoretic  HENT:      Head: Normocephalic and atraumatic  Nose: Nose normal    Eyes:      General:         Right eye: No discharge  Left eye: No discharge  Conjunctiva/sclera: Conjunctivae normal       Pupils: Pupils are equal, round, and reactive to light  Cardiovascular:      Rate and Rhythm: Normal rate and regular rhythm  Heart sounds: Normal heart sounds  No murmur heard  No friction rub  No gallop  Pulmonary:      Effort: Pulmonary effort is normal  No respiratory distress  Breath sounds: Normal breath sounds  No stridor  No wheezing or rales  Abdominal:      General: Bowel sounds are normal  There is no distension  Palpations: Abdomen is soft  Tenderness: There is no abdominal tenderness  Musculoskeletal:      Cervical back: Normal range of motion and neck supple  Skin:     General: Skin is warm and dry  Comments: diffuse rash-see attached image    Neurological:      Mental Status: She is alert and oriented to person, place, and time        Comments: No focal neurological deficit         "

## 2023-08-22 DIAGNOSIS — E03.9 ACQUIRED HYPOTHYROIDISM: ICD-10-CM

## 2023-08-22 RX ORDER — LEVOTHYROXINE SODIUM 0.05 MG/1
50 TABLET ORAL DAILY
Qty: 90 TABLET | Refills: 3 | OUTPATIENT
Start: 2023-08-22

## 2023-09-25 ENCOUNTER — OFFICE VISIT (OUTPATIENT)
Dept: INTERNAL MEDICINE CLINIC | Age: 58
End: 2023-09-25
Payer: COMMERCIAL

## 2023-09-25 VITALS
DIASTOLIC BLOOD PRESSURE: 82 MMHG | TEMPERATURE: 98.4 F | BODY MASS INDEX: 23.05 KG/M2 | WEIGHT: 135 LBS | SYSTOLIC BLOOD PRESSURE: 132 MMHG | OXYGEN SATURATION: 99 % | HEIGHT: 64 IN | HEART RATE: 68 BPM

## 2023-09-25 DIAGNOSIS — G43.109 MIGRAINE WITH AURA AND WITHOUT STATUS MIGRAINOSUS, NOT INTRACTABLE: Primary | ICD-10-CM

## 2023-09-25 DIAGNOSIS — G25.81 RESTLESS LEG SYNDROME: ICD-10-CM

## 2023-09-25 DIAGNOSIS — E03.9 ACQUIRED HYPOTHYROIDISM: ICD-10-CM

## 2023-09-25 DIAGNOSIS — L23.9 ALLERGIC CONTACT DERMATITIS, UNSPECIFIED TRIGGER: ICD-10-CM

## 2023-09-25 PROBLEM — M25.512 CHRONIC LEFT SHOULDER PAIN: Status: RESOLVED | Noted: 2022-03-14 | Resolved: 2023-09-25

## 2023-09-25 PROBLEM — G89.29 CHRONIC LEFT SHOULDER PAIN: Status: RESOLVED | Noted: 2022-03-14 | Resolved: 2023-09-25

## 2023-09-25 PROCEDURE — 99214 OFFICE O/P EST MOD 30 MIN: CPT | Performed by: INTERNAL MEDICINE

## 2023-09-25 RX ORDER — MOMETASONE FUROATE 1 MG/G
CREAM TOPICAL DAILY
Qty: 60 G | Refills: 0 | Status: SHIPPED | OUTPATIENT
Start: 2023-09-25

## 2023-09-25 RX ORDER — ROPINIROLE 0.5 MG/1
0.5 TABLET, FILM COATED ORAL
Qty: 30 TABLET | Refills: 1 | Status: SHIPPED | OUTPATIENT
Start: 2023-09-25

## 2023-09-25 NOTE — PROGRESS NOTES
Assessment/Plan:     Diagnoses and all orders for this visit:    Migraine with aura and without status migrainosus, not intractable    Acquired hypothyroidism    Allergic contact dermatitis, unspecified trigger  -     mometasone (ELOCON) 0.1 % cream; Apply topically daily    Restless leg syndrome  -     rOPINIRole (REQUIP) 0.5 mg tablet; Take 1 tablet (0.5 mg total) by mouth daily at bedtime             M*Modal software was used to dictate this note. It may contain errors with dictating incorrect words or incorrect spelling. Please contact the provider directly with any questions. Subjective:      Chief Complaint   Patient presents with   • Follow-up     Patient is here for 6 month follow-up    •      Patient due for:  Physical   Cervical cancer screen - patient aware she is due and will schedule with her GYN   Mammogram - [facility] 7/19/2023  PHQ    • Leg Pain     Patient gets woken up by left leg pain   Finds she seems it becomes restless at night    • Generalized Body Aches     Patient has been having pains in back, neck, and legs         Patient ID: Alberto Armijo is a 59 y.o. female. HPI  This is a very pleasant 59 years young lady who is here today for the regular follow-up she is doing well no new complaints except for aches and pains in different joints but she does do some exercises and feels better with that  He is having problems with the restless leg which wake her up in the middle of the night when she does the exercises her legs are better but most of the night she gets the pain and the restless leg she denying any numbness or tingling in the leg.   Complaining of "upper and the lower lips dryness at the upper part of the lips sometimes along cream but did not help  hypothyroidism TSH is normal.    The following portions of the patient's history were reviewed and updated as appropriate: allergies, current medications, past family history, past medical history, past social history, past surgical history and problem list.    Review of Systems   Constitutional: Negative for chills and fatigue. HENT: Negative for congestion, ear pain, hearing loss, postnasal drip, sinus pressure, sore throat and voice change. Eyes: Negative for pain, discharge and visual disturbance. Respiratory: Negative for cough, chest tightness and shortness of breath. Cardiovascular: Negative for chest pain, palpitations and leg swelling. Gastrointestinal: Negative for abdominal pain, blood in stool, diarrhea, nausea and rectal pain. Genitourinary: Negative for difficulty urinating, dysuria and urgency. Musculoskeletal: Positive for back pain. Negative for arthralgias and joint swelling. Leg pain ,restless leg   Skin: Negative for rash. Allergic/Immunologic: Negative for environmental allergies and food allergies. Neurological: Negative for dizziness, tremors, weakness, numbness and headaches. Hematological: Negative for adenopathy. Psychiatric/Behavioral: Negative for behavioral problems and hallucinations.          Past Medical History:   Diagnosis Date   • Allergic rhinitis due to pollen     last assessed 11/11/13   • Asthma    • Common migraine without aura     last assessed 06/03/14   • Seasonal allergies     last assessed 11/11/13         Current Outpatient Medications:   •  calcium carbonate-vitamin D (OSCAL-D) 500 mg-200 units per tablet, Take 1 tablet by mouth daily with breakfast, Disp: 30 tablet, Rfl: 5  •  Cetirizine HCl (ZYRTEC PO), Take by mouth as needed, Disp: , Rfl:   •  cholecalciferol (VITAMIN D3) 1,000 units tablet, Take 1 tablet (1,000 Units total) by mouth daily, Disp: 30 tablet, Rfl: 5  •  fluticasone (FLONASE) 50 mcg/act nasal spray, 1 spray into each nostril daily (Patient taking differently: 1 spray into each nostril if needed), Disp: 16 g, Rfl: 0  •  levothyroxine 50 mcg tablet, Take 1 tablet (50 mcg total) by mouth daily, Disp: 90 tablet, Rfl: 1  •  pyridoxine (VITAMIN B6) 100 mg tablet, Vitamin B6, Disp: , Rfl:   •  SUMAtriptan (IMITREX) 50 mg tablet, Take 1 tablet (50 mg total) by mouth once as needed for migraine for up to 1 dose, Disp: 9 tablet, Rfl: 3  •  triamcinolone (KENALOG) 0.5 % cream, Apply topically 2 (two) times a day, Disp: 15 g, Rfl: 0    Allergies   Allergen Reactions   • Seasonal Ic [Cholestatin] Nasal Congestion       Social History   Past Surgical History:   Procedure Laterality Date   • CARPAL TUNNEL RELEASE Bilateral 2003   • TONSILLECTOMY       Family History   Problem Relation Age of Onset   • Coronary artery disease Mother    • Diabetes Mother         in her 42's   • Hypertension Mother    • Other Mother         2 children living   • Heart failure Mother    • Heart disease Mother    • Colon cancer Father    • Thyroid disease Father        Objective:  /82 (BP Location: Left arm, Patient Position: Sitting, Cuff Size: Standard)   Pulse 68   Temp 98.4 °F (36.9 °C) (Temporal)   Ht 5' 4" (1.626 m)   Wt 61.2 kg (135 lb)   SpO2 99%   BMI 23.17 kg/m²        Physical Exam  Constitutional:       Appearance: She is well-developed. HENT:      Right Ear: External ear normal.   Eyes:      Conjunctiva/sclera: Conjunctivae normal.      Pupils: Pupils are equal, round, and reactive to light. Neck:      Thyroid: No thyromegaly. Vascular: No JVD. Cardiovascular:      Rate and Rhythm: Normal rate and regular rhythm. Heart sounds: Normal heart sounds. Pulmonary:      Breath sounds: Normal breath sounds. Abdominal:      General: Bowel sounds are normal.      Palpations: Abdomen is soft. Musculoskeletal:         General: Normal range of motion. Cervical back: Normal range of motion. Lymphadenopathy:      Cervical: No cervical adenopathy. Skin:     General: Skin is dry. Neurological:      Mental Status: She is alert and oriented to person, place, and time. Deep Tendon Reflexes: Reflexes are normal and symmetric.    Psychiatric: Mood and Affect: Mood normal.         Behavior: Behavior normal.

## 2023-09-26 ENCOUNTER — TELEPHONE (OUTPATIENT)
Dept: ADMINISTRATIVE | Facility: OTHER | Age: 58
End: 2023-09-26

## 2023-09-26 NOTE — TELEPHONE ENCOUNTER
----- Message from Liu West RN sent at 9/25/2023  4:20 PM EDT -----  09/25/23 4:20 PM    Hello, our patient Abran Ramey has had Mammogram completed/performed. Please assist in updating the patient chart by pulling the Care Everywhere (CE) document. The date of service is 7/19/2023.      Thank you,  Vanessa Su RN  [facility]

## 2023-09-26 NOTE — TELEPHONE ENCOUNTER
Upon review of the In Basket request we were able to locate, review, and update the patient chart as requested for Mammogram.    Any additional questions or concerns should be emailed to the Practice Liaisons via the appropriate education email address, please do not reply via In Basket.     Thank you  Colin Marroquin

## 2023-10-02 DIAGNOSIS — E03.9 ACQUIRED HYPOTHYROIDISM: ICD-10-CM

## 2023-10-02 RX ORDER — LEVOTHYROXINE SODIUM 0.05 MG/1
50 TABLET ORAL DAILY
Qty: 90 TABLET | Refills: 1 | Status: SHIPPED | OUTPATIENT
Start: 2023-10-02

## 2023-11-07 DIAGNOSIS — G25.81 RESTLESS LEG SYNDROME: ICD-10-CM

## 2023-11-07 RX ORDER — ROPINIROLE 0.5 MG/1
0.5 TABLET, FILM COATED ORAL
Qty: 30 TABLET | Refills: 0 | Status: SHIPPED | OUTPATIENT
Start: 2023-11-07

## 2023-12-01 DIAGNOSIS — G25.81 RESTLESS LEG SYNDROME: ICD-10-CM

## 2023-12-01 RX ORDER — ROPINIROLE 0.5 MG/1
0.5 TABLET, FILM COATED ORAL
Qty: 90 TABLET | Refills: 1 | Status: SHIPPED | OUTPATIENT
Start: 2023-12-01

## 2024-01-10 ENCOUNTER — OFFICE VISIT (OUTPATIENT)
Dept: INTERNAL MEDICINE CLINIC | Age: 59
End: 2024-01-10
Payer: COMMERCIAL

## 2024-01-10 VITALS
SYSTOLIC BLOOD PRESSURE: 120 MMHG | HEART RATE: 72 BPM | TEMPERATURE: 97.7 F | WEIGHT: 134 LBS | DIASTOLIC BLOOD PRESSURE: 80 MMHG | HEIGHT: 64 IN | BODY MASS INDEX: 22.88 KG/M2 | OXYGEN SATURATION: 99 %

## 2024-01-10 DIAGNOSIS — G25.81 RESTLESS LEG SYNDROME: ICD-10-CM

## 2024-01-10 DIAGNOSIS — E03.9 ACQUIRED HYPOTHYROIDISM: ICD-10-CM

## 2024-01-10 DIAGNOSIS — E55.9 VITAMIN D DEFICIENCY: ICD-10-CM

## 2024-01-10 DIAGNOSIS — E78.00 HYPERCHOLESTEROLEMIA: Primary | ICD-10-CM

## 2024-01-10 DIAGNOSIS — G43.109 MIGRAINE WITH AURA AND WITHOUT STATUS MIGRAINOSUS, NOT INTRACTABLE: ICD-10-CM

## 2024-01-10 PROCEDURE — 99214 OFFICE O/P EST MOD 30 MIN: CPT | Performed by: INTERNAL MEDICINE

## 2024-01-10 RX ORDER — LEVOTHYROXINE SODIUM 0.05 MG/1
50 TABLET ORAL DAILY
Qty: 90 TABLET | Refills: 1 | Status: SHIPPED | OUTPATIENT
Start: 2024-01-10

## 2024-01-10 RX ORDER — ROPINIROLE 0.5 MG/1
0.5 TABLET, FILM COATED ORAL
Qty: 90 TABLET | Refills: 1 | Status: SHIPPED | OUTPATIENT
Start: 2024-01-10

## 2024-01-10 RX ORDER — SUMATRIPTAN 50 MG/1
50 TABLET, FILM COATED ORAL ONCE AS NEEDED
Qty: 9 TABLET | Refills: 3 | Status: SHIPPED | OUTPATIENT
Start: 2024-01-10

## 2024-01-10 NOTE — PROGRESS NOTES
Assessment/Plan:     Diagnoses and all orders for this visit:    Hypercholesterolemia  -     Lipid panel; Future  -     TSH, 3rd generation with Free T4 reflex; Future    Acquired hypothyroidism  Comments:  tsh stable  f/u labs in 6 months  continue current dose levothyroxine  Orders:  -     levothyroxine 50 mcg tablet; Take 1 tablet (50 mcg total) by mouth daily  -     TSH, 3rd generation with Free T4 reflex; Future    Restless leg syndrome  -     rOPINIRole (REQUIP) 0.5 mg tablet; Take 1 tablet (0.5 mg total) by mouth daily at bedtime  -     Comprehensive metabolic panel; Future  -     UA w Reflex to Microscopic w Reflex to Culture    Migraine with aura and without status migrainosus, not intractable  -     SUMAtriptan (IMITREX) 50 mg tablet; Take 1 tablet (50 mg total) by mouth once as needed for migraine for up to 36 doses  -     CBC and differential; Future    Vitamin D deficiency           M*Perfect [employer] software was used to dictate this note.  It may contain errors with dictating incorrect words or incorrect spelling. Please contact the provider directly with any questions.    Subjective:   Chief Complaint   Patient presents with    Follow-up     3 month follow up         Patient ID: Katelyn Padilla is a 58 y.o. female.    HPI  This is a very pleasant 58 years young lady who is here today for the regular follow-up she is doing very well review of system is essentially unremarkable    Migraine headaches are much controlled she had a 1 episode of migraine headache after the Vin but otherwise she is doing well she is not using the medication too often she will continue with these medications and no change    Hypothyroidism patient's TSH is normal I did not adjust any dose of her levothyroxine she does not have any signs or symptoms of hypothyroidism we will follow-up her TSH in about 4 months and do the dose adjustment as needed.  No side effects of the medications.    Restless leg syndrome she is using  Requip and also is iron supplement she is doing well with that no new complaints regarding diet    The following portions of the patient's history were reviewed and updated as appropriate: allergies, current medications, past family history, past medical history, past social history, past surgical history, and problem list.    Review of Systems   Constitutional:  Negative for chills and fatigue.   HENT:  Negative for congestion, ear pain, hearing loss, postnasal drip, sinus pressure, sore throat and voice change.    Eyes:  Negative for pain, discharge and visual disturbance.   Respiratory:  Negative for cough, chest tightness and shortness of breath.    Cardiovascular:  Negative for chest pain, palpitations and leg swelling.   Gastrointestinal:  Negative for abdominal pain, blood in stool, diarrhea, nausea and rectal pain.   Genitourinary:  Negative for difficulty urinating, dysuria and urgency.   Musculoskeletal:  Negative for arthralgias and joint swelling.   Skin:  Negative for rash.   Allergic/Immunologic: Negative for environmental allergies and food allergies.   Neurological:  Negative for dizziness, tremors, weakness, numbness and headaches.   Hematological:  Negative for adenopathy.   Psychiatric/Behavioral:  Negative for behavioral problems and hallucinations.          Past Medical History:   Diagnosis Date    Allergic rhinitis due to pollen     last assessed 11/11/13    Asthma     Common migraine without aura     last assessed 06/03/14    Seasonal allergies     last assessed 11/11/13         Current Outpatient Medications:     calcium carbonate-vitamin D (OSCAL-D) 500 mg-200 units per tablet, Take 1 tablet by mouth daily with breakfast, Disp: 30 tablet, Rfl: 5    Cetirizine HCl (ZYRTEC PO), Take by mouth as needed, Disp: , Rfl:     fluticasone (FLONASE) 50 mcg/act nasal spray, 1 spray into each nostril daily (Patient taking differently: 1 spray into each nostril if needed), Disp: 16 g, Rfl: 0     "levothyroxine 50 mcg tablet, Take 1 tablet (50 mcg total) by mouth daily, Disp: 90 tablet, Rfl: 1    mometasone (ELOCON) 0.1 % cream, Apply topically daily, Disp: 60 g, Rfl: 0    pyridoxine (VITAMIN B6) 100 mg tablet, Vitamin B6, Disp: , Rfl:     rOPINIRole (REQUIP) 0.5 mg tablet, Take 1 tablet (0.5 mg total) by mouth daily at bedtime, Disp: 90 tablet, Rfl: 1    SUMAtriptan (IMITREX) 50 mg tablet, Take 1 tablet (50 mg total) by mouth once as needed for migraine for up to 36 doses, Disp: 9 tablet, Rfl: 3    cholecalciferol (VITAMIN D3) 1,000 units tablet, Take 1 tablet (1,000 Units total) by mouth daily (Patient not taking: Reported on 1/10/2024), Disp: 30 tablet, Rfl: 5    Allergies   Allergen Reactions    Seasonal Ic [Cholestatin] Nasal Congestion       Social History   Past Surgical History:   Procedure Laterality Date    CARPAL TUNNEL RELEASE Bilateral 2003    TONSILLECTOMY       Family History   Problem Relation Age of Onset    Coronary artery disease Mother     Diabetes Mother         in her 40's    Hypertension Mother     Other Mother         2 children living    Heart failure Mother     Heart disease Mother     Colon cancer Father     Thyroid disease Father        Objective:  /80 (BP Location: Left arm, Patient Position: Sitting, Cuff Size: Standard)   Pulse 72   Temp 97.7 °F (36.5 °C) (Temporal)   Ht 5' 3.98\" (1.625 m)   Wt 60.8 kg (134 lb)   SpO2 99%   BMI 23.02 kg/m²        Physical Exam  Constitutional:       Appearance: She is well-developed.   HENT:      Right Ear: Tympanic membrane and external ear normal.      Left Ear: Tympanic membrane normal.   Eyes:      Conjunctiva/sclera: Conjunctivae normal.      Pupils: Pupils are equal, round, and reactive to light.   Neck:      Thyroid: No thyromegaly.      Vascular: No JVD.   Cardiovascular:      Rate and Rhythm: Normal rate and regular rhythm.      Heart sounds: Normal heart sounds.   Pulmonary:      Breath sounds: Normal breath sounds. "   Abdominal:      General: Bowel sounds are normal.      Palpations: Abdomen is soft.   Musculoskeletal:         General: Normal range of motion.      Cervical back: Normal range of motion.   Lymphadenopathy:      Cervical: No cervical adenopathy.   Skin:     General: Skin is dry.   Neurological:      General: No focal deficit present.      Mental Status: She is alert and oriented to person, place, and time. Mental status is at baseline.      Deep Tendon Reflexes: Reflexes are normal and symmetric.   Psychiatric:         Mood and Affect: Mood normal.         Behavior: Behavior normal.

## 2024-06-19 LAB
ALBUMIN SERPL-MCNC: 4.2 G/DL (ref 3.5–5.7)
ALP SERPL-CCNC: 55 U/L (ref 35–120)
ALT SERPL-CCNC: 8 U/L
ANION GAP SERPL CALCULATED.3IONS-SCNC: 8 MMOL/L (ref 3–11)
AST SERPL-CCNC: 21 U/L
BACTERIA URNS QL MICRO: ABNORMAL
BASOPHILS # BLD AUTO: 0.1 THOU/CMM (ref 0–0.1)
BASOPHILS NFR BLD AUTO: 1 %
BILIRUB SERPL-MCNC: 0.5 MG/DL (ref 0.2–1)
BUN SERPL-MCNC: 20 MG/DL (ref 7–25)
CALCIUM SERPL-MCNC: 9 MG/DL (ref 8.5–10.1)
CHLORIDE SERPL-SCNC: 100 MMOL/L (ref 100–109)
CHOLEST SERPL-MCNC: 195 MG/DL
CHOLEST/HDLC SERPL: 2.6 {RATIO}
CO2 SERPL-SCNC: 31 MMOL/L (ref 21–31)
CREAT SERPL-MCNC: 0.67 MG/DL (ref 0.4–1.1)
CYTOLOGY CMNT CVX/VAG CYTO-IMP: NORMAL
DIFFERENTIAL METHOD BLD: NORMAL
EOSINOPHIL # BLD AUTO: 0.5 THOU/CMM (ref 0–0.5)
EOSINOPHIL NFR BLD AUTO: 10 %
ERYTHROCYTE [DISTWIDTH] IN BLOOD BY AUTOMATED COUNT: 13.4 % (ref 12–16)
GFR/BSA.PRED SERPLBLD CYS-BASED-ARV: 100 ML/MIN/{1.73_M2}
GLUCOSE SERPL-MCNC: 92 MG/DL (ref 65–99)
GLUCOSE UR QL STRIP: NEGATIVE MG/DL
HCT VFR BLD AUTO: 38.9 % (ref 35–43)
HDLC SERPL-MCNC: 74 MG/DL (ref 23–92)
HGB BLD-MCNC: 13.1 G/DL (ref 11.5–14.5)
HGB UR QL STRIP: NEGATIVE MG/DL
KETONES UR QL STRIP: NEGATIVE MG/DL
LDLC SERPL CALC-MCNC: 107 MG/DL
LEUKOCYTE ESTERASE UR QL STRIP: 500 /UL
LYMPHOCYTES # BLD AUTO: 1.7 THOU/CMM (ref 1–3)
LYMPHOCYTES NFR BLD AUTO: 35 %
MCH RBC QN AUTO: 31.4 PG (ref 26–34)
MCHC RBC AUTO-ENTMCNC: 33.7 G/DL (ref 32–37)
MCV RBC AUTO: 93 FL (ref 80–100)
MONOCYTES # BLD AUTO: 0.4 THOU/CMM (ref 0.3–1)
MONOCYTES NFR BLD AUTO: 8 %
MUCOUS THREADS URNS QL MICRO: ABNORMAL
NEUTROPHILS # BLD AUTO: 2.3 THOU/CMM (ref 1.8–7.8)
NEUTROPHILS NFR BLD AUTO: 46 %
NITRITE UR QL STRIP: NEGATIVE
NONHDLC SERPL-MCNC: 121 MG/DL
PH UR: 6 [PH] (ref 4.5–8)
PLATELET # BLD AUTO: 198 THOU/CMM (ref 140–350)
PMV BLD REES-ECKER: 9.1 FL (ref 7.5–11.3)
POTASSIUM SERPL-SCNC: 4.1 MMOL/L (ref 3.5–5.2)
PROT 24H UR-MRATE: NEGATIVE MG/DL
PROT SERPL-MCNC: 6.5 G/DL (ref 6.3–8.3)
RBC # BLD AUTO: 4.18 MILL/CMM (ref 3.7–4.7)
RBC #/AREA URNS HPF: ABNORMAL /HPF (ref 0–2)
SL AMB POCT URINE COMMENT: ABNORMAL
SODIUM SERPL-SCNC: 139 MMOL/L (ref 135–145)
SP GR UR: 1.02 (ref 1–1.03)
SQUAMOUS #/AREA URNS HPF: >10 /LPF (ref 0–5)
TRANS CELLS #/AREA URNS HPF: >10 /LPF (ref 0–1)
TRIGL SERPL-MCNC: 71 MG/DL
TSH SERPL-ACNC: 3.37 UIU/ML (ref 0.45–5.33)
WBC # BLD AUTO: 5 THOU/CMM (ref 4–10)
WBC #/AREA URNS HPF: ABNORMAL /HPF (ref 0–5)

## 2024-07-09 ENCOUNTER — OFFICE VISIT (OUTPATIENT)
Dept: INTERNAL MEDICINE CLINIC | Age: 59
End: 2024-07-09

## 2024-07-09 VITALS
BODY MASS INDEX: 23.05 KG/M2 | HEIGHT: 64 IN | OXYGEN SATURATION: 98 % | WEIGHT: 135 LBS | SYSTOLIC BLOOD PRESSURE: 110 MMHG | HEART RATE: 82 BPM | DIASTOLIC BLOOD PRESSURE: 80 MMHG | TEMPERATURE: 98.2 F

## 2024-07-09 DIAGNOSIS — J04.0 LARYNGITIS: Primary | ICD-10-CM

## 2024-07-09 DIAGNOSIS — G25.81 RESTLESS LEG SYNDROME: ICD-10-CM

## 2024-07-09 DIAGNOSIS — E03.9 ACQUIRED HYPOTHYROIDISM: ICD-10-CM

## 2024-07-09 RX ORDER — AZITHROMYCIN 250 MG/1
TABLET, FILM COATED ORAL
Qty: 6 TABLET | Refills: 0 | Status: SHIPPED | OUTPATIENT
Start: 2024-07-09 | End: 2024-07-13

## 2024-07-09 NOTE — PROGRESS NOTES
[facility]  INTERNAL MEDICINE        NAME: Katelyn Padilla  AGE: 58 y.o. SEX: female    DATE OF ENCOUNTER: 7/9/2024    ASSESSMENT AND PLAN     1. Acquired hypothyroidism  Continue levothyroxine 50 mcg daily    2. Restless leg syndrome  Continue ropinirole 25 mg daily nightly    3. Laryngitis  Presents with complaints of dry cough, hoarseness, congestion, headache for more than a week.  Denies any fever, chest pain, shortness of breath, abdominal pain, nausea, vomiting.  Recently went on vacation prior to symptoms.   had similar symptoms but resolved within 3 to 4 days.  Noticed worsening hoarseness past 1 to 2 days.  Has been using Robitussin DM with some relief.  Non-smoker.  Tested for COVID at home which was negative  On physical exam, lungs clear to auscultation and on physical exam findings including HEENT.  Will treat with azithromycin.  Instructed patient to take lots of fluids, warm steam inhalation and cough suppressants.    - azithromycin (ZITHROMAX) 250 mg tablet; Take 2 tablets today then 1 tablet daily x 4 days  Dispense: 6 tablet; Refill: 0    No orders of the defined types were placed in this encounter.        CHIEF COMPLAINT     Chief Complaint   Patient presents with    Cold Like Symptoms     started out as a cold last week, started with a cough on friday and today is losing voice/raspy    health maintenance      Patient to schedule pap, [facility]    Follow-up     Pt would like to do her regular follow-up today since it was scheduled for tomorrow, labs done       HISTORY OF PRESENT ILLNESS     58-year-old female with past medical history of migraine, hypothyroidism, restless leg syndrome who presents today as a same-day visit.  She reports since last Monday she started having dry cough, congestion, runny nose, headache which initially improved mildly that worsened throughout the weekend.  Denies any fever, chills,  chest pain, shortness of breath, abdominal pain, nausea, vomiting.  Started having worsening hoarseness for past 1 to 2 days.  She is a non-smoker but does have history of allergy for which she takes allergy shots.  She has been using Robitussin DM and Tylenol with some relief.  She was recently on vacation with her  who was sick with similar symptoms but his symptoms resolved within 3 to 4 days.  She tested herself for COVID at home which was negative.    The following portions of the patient's history were reviewed and updated as appropriate: allergies, current medications, past family history, past medical history, past social history, past surgical history and problem list.    REVIEW OF SYSTEMS     Review of Systems   Constitutional: Negative for chills, fever and night sweats.   HENT:  Positive for congestion and hoarse voice. Negative for ear pain and sore throat.    Eyes:  Negative for pain.   Cardiovascular:  Negative for chest pain and dyspnea on exertion.   Respiratory:  Positive for cough. Negative for shortness of breath and sputum production.    Gastrointestinal:  Negative for abdominal pain, diarrhea, nausea and vomiting.   All other systems reviewed and are negative.        All other ROS negative except per HPI    ACTIVE PROBLEM LIST     Patient Active Problem List   Diagnosis    Acquired hypothyroidism    Classic migraine with aura    Spider veins of limb    Vitamin D deficiency    Allergic contact dermatitis       Past Medical History:   Diagnosis Date    Allergic rhinitis due to pollen     last assessed 11/11/13    Asthma     Common migraine without aura     last assessed 06/03/14    Seasonal allergies     last assessed 11/11/13       CURRENT MEDICATIONS       Current Outpatient Medications:     calcium carbonate-vitamin D (OSCAL-D) 500 mg-200 units per tablet, Take 1 tablet by mouth daily with breakfast, Disp: 30 tablet, Rfl: 5    Cetirizine HCl (ZYRTEC PO), Take by mouth as needed, Disp: ,  "Rfl:     fluticasone (FLONASE) 50 mcg/act nasal spray, 1 spray into each nostril daily (Patient taking differently: 1 spray into each nostril if needed), Disp: 16 g, Rfl: 0    levothyroxine 50 mcg tablet, Take 1 tablet (50 mcg total) by mouth daily, Disp: 90 tablet, Rfl: 1    mometasone (ELOCON) 0.1 % cream, Apply topically daily, Disp: 60 g, Rfl: 0    pyridoxine (VITAMIN B6) 100 mg tablet, Vitamin B6, Disp: , Rfl:     rOPINIRole (REQUIP) 0.5 mg tablet, Take 1 tablet (0.5 mg total) by mouth daily at bedtime, Disp: 90 tablet, Rfl: 1    SUMAtriptan (IMITREX) 50 mg tablet, Take 1 tablet (50 mg total) by mouth once as needed for migraine for up to 36 doses, Disp: 9 tablet, Rfl: 3    cholecalciferol (VITAMIN D3) 1,000 units tablet, Take 1 tablet (1,000 Units total) by mouth daily (Patient not taking: Reported on 1/10/2024), Disp: 30 tablet, Rfl: 5    Allergies   Allergen Reactions    Seasonal Ic [Cholestatin] Nasal Congestion       Social History   Past Surgical History:   Procedure Laterality Date    CARPAL TUNNEL RELEASE Bilateral 2003    TONSILLECTOMY       Family History   Problem Relation Age of Onset    Coronary artery disease Mother     Diabetes Mother         in her 40's    Hypertension Mother     Other Mother         2 children living    Heart failure Mother     Heart disease Mother     Colon cancer Father     Thyroid disease Father        OBJECTIVE     /80 (BP Location: Left arm, Patient Position: Sitting, Cuff Size: Adult)   Pulse 82   Temp 98.2 °F (36.8 °C) (Temporal)   Ht 5' 4\" (1.626 m)   Wt 61.2 kg (135 lb)   SpO2 98% Comment: ra  BMI 23.17 kg/m²     Physical Exam  Vitals reviewed.   Constitutional:       Appearance: Normal appearance. She is normal weight.   HENT:      Head: Normocephalic and atraumatic.      Right Ear: Tympanic membrane, ear canal and external ear normal.      Left Ear: Tympanic membrane, ear canal and external ear normal.      Nose: Nose normal.      Mouth/Throat:      " Mouth: Mucous membranes are dry.      Pharynx: Oropharynx is clear.   Eyes:      Extraocular Movements: Extraocular movements intact.      Conjunctiva/sclera: Conjunctivae normal.      Pupils: Pupils are equal, round, and reactive to light.   Cardiovascular:      Rate and Rhythm: Normal rate and regular rhythm.      Pulses: Normal pulses.      Heart sounds: Normal heart sounds.   Pulmonary:      Effort: Pulmonary effort is normal.      Breath sounds: Normal breath sounds.   Abdominal:      General: Bowel sounds are normal.      Palpations: Abdomen is soft.   Neurological:      Mental Status: She is alert and oriented to person, place, and time. Mental status is at baseline.           Pertinent Laboratory/Diagnostic Studies:     Patient was never admitted.    Images and diagnostics reviewed     HEALTH MAINTENANCE     Health Maintenance   Topic Date Due    Annual Physical  Never done    Cervical Cancer Screening  03/20/2022    DTaP,Tdap,and Td Vaccines (2 - Td or Tdap) 07/23/2022    COVID-19 Vaccine (7 - 2023-24 season) 09/01/2023    RSV Vaccine Age 60+ Years (1 - 1-dose 60+ series) Never done    Zoster Vaccine (2 of 2) 05/30/2024    Breast Cancer Screening: Mammogram  07/19/2024    Influenza Vaccine (1) 09/01/2024    Depression Screening  07/09/2025    Colorectal Cancer Screening  03/14/2028    HIV Screening  Completed    Hepatitis C Screening  Completed    RSV Vaccine age 0-20 Months  Aged Out    Pneumococcal Vaccine: Pediatrics (0 to 5 Years) and At-Risk Patients (6 to 64 Years)  Aged Out    HIB Vaccine  Aged Out    IPV Vaccine  Aged Out    Hepatitis A Vaccine  Aged Out    Meningococcal ACWY Vaccine  Aged Out    HPV Vaccine  Aged Out     Immunization History   Administered Date(s) Administered    COVID-19 PFIZER VACCINE 0.3 ML IM 03/02/2021, 03/23/2021, 11/19/2021, 05/16/2022    COVID-19 Pfizer Vac BIVALENT Shamir-sucrose 12 Yr+ IM 12/01/2022    COVID-19, unspecified 03/02/2021    INFLUENZA 11/01/2006, 10/17/2007,  10/01/2009, 10/18/2010, 01/20/2013, 10/30/2016, 10/03/2017    Influenza Injectable, MDCK, Preservative Free, Quadrivalent 10/20/2019    Influenza Quadrivalent Preservative Free 3 years and older IM 10/30/2016, 10/03/2017    Influenza, recombinant, quadrivalent,injectable, preservative free 10/13/2020    Influenza, seasonal, injectable 10/18/2010, 10/01/2013    Tdap 07/23/2012       I globally spent 35 minutes face-to-face with the patient and with chart review.     Alberto Jones, DO  Internal Medicine PGY-3

## 2024-07-10 DIAGNOSIS — E03.9 ACQUIRED HYPOTHYROIDISM: ICD-10-CM

## 2024-07-10 DIAGNOSIS — G25.81 RESTLESS LEG SYNDROME: ICD-10-CM

## 2024-07-10 RX ORDER — ROPINIROLE 0.5 MG/1
0.5 TABLET, FILM COATED ORAL
Qty: 90 TABLET | Refills: 1 | Status: SHIPPED | OUTPATIENT
Start: 2024-07-10

## 2024-07-10 RX ORDER — LEVOTHYROXINE SODIUM 0.05 MG/1
50 TABLET ORAL DAILY
Qty: 90 TABLET | Refills: 1 | Status: SHIPPED | OUTPATIENT
Start: 2024-07-10

## 2024-08-08 DIAGNOSIS — Z00.6 ENCOUNTER FOR EXAMINATION FOR NORMAL COMPARISON OR CONTROL IN CLINICAL RESEARCH PROGRAM: ICD-10-CM

## 2024-08-14 ENCOUNTER — APPOINTMENT (OUTPATIENT)
Dept: LAB | Age: 59
End: 2024-08-14

## 2024-08-14 DIAGNOSIS — Z00.6 ENCOUNTER FOR EXAMINATION FOR NORMAL COMPARISON OR CONTROL IN CLINICAL RESEARCH PROGRAM: ICD-10-CM

## 2024-08-14 PROCEDURE — 36415 COLL VENOUS BLD VENIPUNCTURE: CPT

## 2024-09-19 LAB
APOB+LDLR+PCSK9 GENE MUT ANL BLD/T: NOT DETECTED
BRCA1+BRCA2 DEL+DUP + FULL MUT ANL BLD/T: ABNORMAL
GENE DIS ANL INTERP-IMP: POSITIVE
MLH1+MSH2+MSH6+PMS2 GN DEL+DUP+FUL M: NOT DETECTED

## 2024-09-20 ENCOUNTER — TELEPHONE (OUTPATIENT)
Dept: OTHER | Facility: HOSPITAL | Age: 59
End: 2024-09-20

## 2024-09-23 ENCOUNTER — TELEPHONE (OUTPATIENT)
Dept: GENETICS | Facility: CLINIC | Age: 59
End: 2024-09-23

## 2024-09-23 ENCOUNTER — TELEPHONE (OUTPATIENT)
Dept: OTHER | Facility: HOSPITAL | Age: 59
End: 2024-09-23

## 2024-09-23 DIAGNOSIS — R89.8 ABNORMAL GENETIC TEST: Primary | ICD-10-CM

## 2024-09-23 NOTE — TELEPHONE ENCOUNTER
Katelyn participated in the [employer] [facility] research study and has an actionable result related to HBOC. She would like a referral to a genetic counselor.

## 2024-10-01 ENCOUNTER — DOCUMENTATION (OUTPATIENT)
Dept: GENETICS | Facility: CLINIC | Age: 59
End: 2024-10-01

## 2024-10-03 ENCOUNTER — OFFICE VISIT (OUTPATIENT)
Dept: GENETICS | Facility: CLINIC | Age: 59
End: 2024-10-03

## 2024-10-03 DIAGNOSIS — Z15.09 BRCA1 POSITIVE: Primary | ICD-10-CM

## 2024-10-03 DIAGNOSIS — R89.8 ABNORMAL GENETIC TEST: ICD-10-CM

## 2024-10-03 DIAGNOSIS — Z15.01 BRCA1 POSITIVE: Primary | ICD-10-CM

## 2024-10-03 NOTE — PROGRESS NOTES
Post-Test Genetic Counseling Consult Note    Patient Name: Katelyn Padilla   /Age: 1965/59 y.o.    Date of Service: 10/3/2024  Genetic Counselor: Trish Scott MS, [facility] [place]  Interpretation Services: None  Location: Telephone consult   Length of Visit:  30 Minutes    Katelyn presents today for a consult regarding her [employer] test results.    Genetic Testing History:     Report Date: 24     Test: [employer] Molecular Screen (11 genes): BRCA1, BRCA2, MLH1, MSH2, MSH6, PMS2, EPCAM, APOB, LDLR, LDLRAP1, and PCSK9       Result: Positive     BRCA1 c.5266dup ([id number]); Heterozygous; Pathogenic     Relevant Family History   Patient reports non-Ashkenazi Lutheran ancestry.     Maternal Family History:  Aunt: bone cancer diagnosed in childhood (d.10)     Paternal Family History:  Father: colon cancer diagnosed at 74 (d.86)     Please refer to the scanned pedigree in the Media Tab for a complete family history     *All history is reported as provided by the patient; records are not available for review, except where indicated.     [facility] Result:  Katelyn underwent genetic testing through the [facility] health research program due to her family history of colon cancer. As a part of this program, 11 genes associated with hereditary breast and ovarian cancer (HBOC) syndrome, Meng syndrome and familial hypercholesterolemia (FH) were tested. Katelyn's result returned positive for the BRCA1 gene.     Katelyn was surprised by this result and asked about the accuracy of this test. We reviewed that this test delivers >99% sensitivity and specificity for single nucleotide variants and insertions and deletions up to 20 bp.    During today's visit, Katelyn and I reviewed the cancer risks associated with her result, and the recommendations for screening and management.    Assessment:  Katelyn carries one pathogenic variant in the BRCA1 gene, specifically c.5266dup ([id number]). The  BRCA1 gene is associated with autosomal dominant hereditary breast and ovarian cancer (HBOC) syndrome ([employer] UID: [id number]).     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      Hereditary breast and ovarian cancer (HBOC) syndrome  Women with a single BRCA1 pathogenic variant have approximately a 60-72% lifetime risk of breast cancer. The risk for a second primary breast cancer within 20 years of the first diagnosis is between 30-40%. In premenopausal women, the risk for a second breast cancer within 15 years of the first diagnosis is >20%.    Women also have up to a 16-59% lifetime risk for ovarian, fallopian tube, or peritoneal cancer to age 70.      Men with a BRCA1 pathogenic variant have up to a .2-1.2% risk for breast cancer and up to a 7-26% risk for prostate cancer.      Men and women also have an elevated risk for melanoma and up to a <=5% risk for pancreatic cancer.     Risk Based on Family History  Katelyn's risk of colorectal cancer is increased, based on the reported family history. As compared with the general population risk of approximately 4%, empiric data suggest that Annmaries risk to develop colorectal cancer by age 79 is approximately 9% given one first-degree relative with colorectal cancer.       Risks and Testing for Family Members:  This test result may help clarify the risk for other family members to develop cancer. There is up to a 50% chance all first-degree relatives (parents, siblings and children) inherited the BRCA1 pathogenic variant.  Other relatives such as aunts, uncles and cousins may also be at risk.      We reviewed that the testing cannot determine whether the familial variant was inherited from her maternal or paternal side. Thus, we recommend that Katelyn share this test results with extended family members from both sides of her family.       If Katelyn family members have any questions or are interested in testing they can reach out  to the [facility] number at [phone] for additional information.     Managment:  Management guidelines for individuals with pathogenic and likely pathogenic BRCA1 variants have been developed by the National Comprehensive Cancer Network.  Please refer to the current NCCN guidelines for the most up to date guidelines (https://www.nccn.org/guidelines/category_2).  The recommendations listed below are specific to Katelyn and are are based on recommendations in the the NCCN guidelines as of 10/3/24.  These recommendations are subject to change over time and the newest guidelines should be referenced for the most up to date recommendations.       Plan:  WOMEN  Breast Cancer Screening  -Breast awareness starting at age 18 y  -Clinical breast exam every 6-12 mo, starting at age 25 y     -Age 25-29 y, annual breast MRI screening with and without contrast (or mammogram only if MRI is unavailable) or individualized based on family history if a breast cancer diagnosis before 30 is present.    -Age 30-75y, annual mammogram and breast MRI screening with contrast    -Age ?75 y, management should be considered on an individual basis.    -Discuss option of risk-reducing mastectomy    -For women with a BRCA pathogenic/likely pathogenic variant who are treated for breast cancer and have not had a bilateral mastectomy, screening with annual mammogram and breast MRI should continue as described above.    Referral placed to breast specialist in the surgical oncology department.     Gynecologic Cancer Screening  Ovarian Cancer  -Recommend risk-reducing salpingo-oophorectomy (RRSO), typically between 35 and 40y, and upon completion of child bearing. See Risk-Reducing Salpingo-Oophorectomy (RRSO) Protocol in NCCN Guidelines for Ovarian Cancer - Principles of Surgery.    -Salpingectomy alone is not the standard of care for risk reduction, although clinical trials of interval salpingectomy and delayed oophorectomy are ongoing. The  concern for risk-reducing salpingectomy alone is that women are still at risk for developing ovarian cancer. In addition, in premenopausal women, oophorectomy likely reduces the risk of developing breast cancer but the magnitude is uncertain and may be gene-specific.    Uterine Cancer   -Limited date suggest that there may be a slightly increased risk of serous uterine cancer among women with a BRCA1 pathogenic/likely pathogenic variant. The clinical significance of these findings is unclear. Further evaluation of the risk of serous uterine cancer in the BRCA population needs to be undertaken. The provider and patient should discuss the risks and benefits of concurrent hysterectomy at the time of RRSO for women with a BRCA1 pathogenic/likely pathogenic variant prior to surgery.  Women who undergo hysterectomy at the time of RRSO are candidates for estrogen alone hormone replacement therapy which is associated with a decreased risk of breast cancer compared to combined estrogen and progesterone which is required when the uterus is left in situ.       We offered a referral to gynecologic oncology but Katelyn declined.  She would prefer to discuss her result with her OB-GYN.     Skin Cancer Screening  -No specific screening guidelines exist for melanoma, but general melanoma risk management is appropriate, such as annual full-body skin examination and minimizing UV exposure.    Referral placed to dermatology.     Pancreatic Cancer Screening:  Current NCCN Guidelines recommend pancreatic cancer screening for individuals with an BRCA1 pathogenic variant and a first- or second-degree relative with exocrine pancreatic cancer from the same side of the family as the identified pathogenic/likely pathogenic germline variant. Pancreatic cancer screening typically begins at age 50 (or 10 years younger than the earliest exocrine pancreatic cancer diagnosis in the family, whichever is earlier) and includes contrast-enhanced  MRI/MRCP (magnetic resonance cholangiopancreatography) and/or endoscopic ultrasound (EU).      Currently there is no known family history of pancreatic cancer therefore we do not recommend pancreatic screening for Katelyn at this time. We did encourage Katelyn to keep her healthcare providers updated on any changes to her personal or family history as updated information may change this recommendation.    Other Screening:  Recommendations for Katelyn are outlined below based on her family history, however the surveillance and medical management should continue as clinically indicated and as determined appropriate by her healthcare providers.    NCCN Colorectal Cancer Screening V1.2024  >=1 First-degree relative with colorectal cancer at any age  Screening colonoscopy every 5 years unless more frequent screening is clinically indicated     Additional Testing:  Katelyn and I reviewed that the testing performed in this study assessed for 7 cancer risk-related genes. Additional cancer risk genes are available for diagnostic testing. Based on Katelyn's reported family history  she does not meet additional criteria, and is not interested in further testing. I provided my office's contact information should there be any future questions or interest in pursuing additional testing.    Additional Information:  A healthy lifestyle will improve overall health and reduce risk for illness. Eating a healthy diet and exercising for 4 hours per week is recommended. Both diet and exercise have been shown to help maintain a healthy weight. Postmenopausal women who are overweight are at higher risk for breast cancer. Moderate to heavy alcohol use can increase the risk for some cancers. Smoking cigarettes can also increase risk for breast, lung, prostate, pancreatic and other cancers.    Positive Result: Katelyn was strongly encouraged to follow up on with our office on an annual basis to review the most up to date guidelines as  recommendations are subject to change over time.

## 2024-10-04 ENCOUNTER — TELEPHONE (OUTPATIENT)
Dept: HEMATOLOGY ONCOLOGY | Facility: CLINIC | Age: 59
End: 2024-10-04

## 2024-10-04 NOTE — TELEPHONE ENCOUNTER
Referral to Surgical Oncology received.  Chart reviewed by  for Surgical oncology at this time.       Diagnosis:   Diagnosis   Z15.01, Z15.09 (ICD-10-CM) - BRCA1 positive     After review of chart, instructions for scheduling added to referral and sent to be scheduled as advised.

## 2024-11-26 ENCOUNTER — DOCUMENTATION (OUTPATIENT)
Dept: HEMATOLOGY ONCOLOGY | Facility: CLINIC | Age: 59
End: 2024-11-26

## 2024-11-26 ENCOUNTER — TELEPHONE (OUTPATIENT)
Dept: GENETICS | Facility: CLINIC | Age: 59
End: 2024-11-26

## 2024-11-26 ENCOUNTER — OFFICE VISIT (OUTPATIENT)
Dept: SURGICAL ONCOLOGY | Facility: CLINIC | Age: 59
End: 2024-11-26
Payer: COMMERCIAL

## 2024-11-26 VITALS
WEIGHT: 137.2 LBS | DIASTOLIC BLOOD PRESSURE: 78 MMHG | SYSTOLIC BLOOD PRESSURE: 134 MMHG | TEMPERATURE: 97.3 F | HEIGHT: 64 IN | BODY MASS INDEX: 23.42 KG/M2 | HEART RATE: 69 BPM | OXYGEN SATURATION: 98 %

## 2024-11-26 DIAGNOSIS — Z80.0 FHX: COLON CANCER: ICD-10-CM

## 2024-11-26 DIAGNOSIS — Z12.31 VISIT FOR SCREENING MAMMOGRAM: ICD-10-CM

## 2024-11-26 DIAGNOSIS — Z15.09 BRCA1 GENE MUTATION POSITIVE: Primary | ICD-10-CM

## 2024-11-26 DIAGNOSIS — Z80.3 FHX: BREAST CANCER: ICD-10-CM

## 2024-11-26 DIAGNOSIS — Z15.01 BRCA1 POSITIVE: Primary | ICD-10-CM

## 2024-11-26 DIAGNOSIS — Z91.89 AT HIGH RISK FOR BREAST CANCER: ICD-10-CM

## 2024-11-26 DIAGNOSIS — Z15.01 BRCA1 GENE MUTATION POSITIVE: Primary | ICD-10-CM

## 2024-11-26 DIAGNOSIS — Z91.89 HIGH RISK OF OVARIAN CANCER: ICD-10-CM

## 2024-11-26 DIAGNOSIS — Z15.09 BRCA1 POSITIVE: Primary | ICD-10-CM

## 2024-11-26 DIAGNOSIS — Z12.39 BREAST CANCER SCREENING OTHER THAN MAMMOGRAM: ICD-10-CM

## 2024-11-26 PROCEDURE — 99244 OFF/OP CNSLTJ NEW/EST MOD 40: CPT

## 2024-11-26 NOTE — LETTER
November 26, 2024     Edwar Vyas MD  [address] [place] [address]    Patient: Katelyn Padilla   YOB: 1965   Date of Visit: 11/26/2024       Dear Dr. Vyas:    Thank you for referring Katelyn Padilla to me for evaluation. Below are my notes for this consultation.    If you have questions, please do not hesitate to call me. I look forward to following your patient along with you.         Sincerely,        TRAY Pathak        CC: DO Siena Henry CRNP  11/26/2024 11:24 AM  Sign when Signing Visit               Surgical Oncology Consult       [facility] [address] [address]  [phone]    Katelyn Padilla  1965  [id number]  [facility] [address] [address]  [phone]    1. BRCA1 positive  Assessment & Plan:  I have discussed the implications of carrying a BRCA1 mutation.  Although statistically BRCA1 carriers have ~60% lifetime risk for breast cancer, her individual TC lifetime score is 30%.  I have discussed the importance of supplemental breast imaging for early detection, and she is agreeable to having breast MRI performed.  We did discuss risk-reduction including prophylactic mastectomies, but she is not interested in any preventative measures at this time. I have also reviewed her increased risk for ovarian cancer and discussed that the standard of care is to undergo risk-reducing BSO.  I have encouraged her to discuss testing with her children and siblings, and reviewed that males can also carry the BRCA1 gene.  Male carriers have a high risk of prostate cancer.  In addition, she carries a very small risk for pancreatic or peritoneal cancer, although neither of these are present in her family history. She will plan to see her gynecologist yearly,  and I will see her yearly as well, so that she is receiving 2 clinical breast exams annually.  Orders:  -     Ambulatory referral to Surgical Oncology  -     MRI breast bilateral w and wo contrast w cad; Future; Expected date: 02/01/2025  -     BUN; Future; Expected date: 02/01/2025  -     Creatinine, serum; Future; Expected date: 02/01/2025  -     Ambulatory Referral to Gynecologic Oncology; Future  2. At high risk for breast cancer  Assessment & Plan:  We will plan on annual 3D mammogram staggered with annual breast MRI.  She is advised to have a low threshold for any changes on self-exam.  I will see her again in 1 year for another exam.  Orders:  -     MRI breast bilateral w and wo contrast w cad; Future; Expected date: 02/01/2025  3. High risk of ovarian cancer  Assessment & Plan:  Given her positive BRCA1 status, I will refer her to Gynecologic Oncology for discussion of risk-reducing BSO.  Orders:  -     Ambulatory Referral to Gynecologic Oncology; Future  4. Breast cancer screening other than mammogram  -     MRI breast bilateral w and wo contrast w cad; Future; Expected date: 02/01/2025  5. Visit for screening mammogram  -     Mammo screening bilateral w 3d and cad; Future; Expected date: 07/30/2025      Chief Complaint   Patient presents with   • New Patient Visit       Return in about 1 year (around 11/26/2025) for Office Visit, Imaging - See orders.        History of Present Illness: This is a 58 y/o female who presents today in consultation for a BRCA1 mutation discovered on recent genetic screening.  She reports that this came a surprise to her, as she has no family history of ovarian cancer, and only minimal family history of breast cancer.  Her father had colon cancer at the age of 74.  She reports a paternal aunt with breast cancer in her 70's and a paternal cousin with breast cancer in her 50's.  Both of these were presumably found very early, as they were reportedly treated with hormone therapy  alone.  The patient states that none of her relatives have undergone genetic testing. The patient denies any history of any breast biopsies, surgeries or infections.  She denies any changes on self-exam, and has not noticed any breast lumps, skin changes or tenderness.  She reports menarche at age 11 and had her first child at 26.  She experienced menopause at 55 and has never used HRT.  I have calculated her lifetime TC risk to be 30%.        Review of Systems   Constitutional:  Negative for activity change, appetite change, fatigue and unexpected weight change.   HENT: Negative.     Respiratory: Negative.     Cardiovascular: Negative.    Gastrointestinal: Negative.    Musculoskeletal: Negative.    Skin: Negative.    Neurological: Negative.  Negative for dizziness and headaches.   Hematological: Negative.    Psychiatric/Behavioral: Negative.                 Patient Active Problem List   Diagnosis   • Acquired hypothyroidism   • Classic migraine with aura   • Spider veins of limb   • Vitamin D deficiency   • Allergic contact dermatitis   • BRCA1 positive   • High risk of ovarian cancer   • At high risk for breast cancer     Past Medical History:   Diagnosis Date   • Allergic rhinitis due to pollen     last assessed 11/11/13   • Asthma    • Common migraine without aura     last assessed 06/03/14   • Seasonal allergies     last assessed 11/11/13     Past Surgical History:   Procedure Laterality Date   • CARPAL TUNNEL RELEASE Bilateral 2003   • TONSILLECTOMY       Family History   Problem Relation Age of Onset   • Coronary artery disease Mother    • Diabetes Mother         in her 40's   • Hypertension Mother    • Heart failure Mother    • Heart disease Mother    • Colon cancer Father 74   • Thyroid disease Father    • Skin cancer Father    • Bone cancer Maternal Aunt 10   • Breast cancer Paternal Aunt         70's, treated with hormone therapy alone   • Breast cancer Paternal Cousin         50's, treated with hormone  therapy alone     Social History     Socioeconomic History   • Marital status: /Civil Union     Spouse name: Not on file   • Number of children: Not on file   • Years of education: Not on file   • Highest education level: Not on file   Occupational History   • Occupation: clerical occupation   Tobacco Use   • Smoking status: Never   • Smokeless tobacco: Never   Vaping Use   • Vaping status: Never Used   Substance and Sexual Activity   • Alcohol use: Yes     Comment: occassionally   • Drug use: No   • Sexual activity: Yes     Partners: Male   Other Topics Concern   • Not on file   Social History Narrative   • Not on file     Social Drivers of Health     Financial Resource Strain: Not on file   Food Insecurity: Not on file   Transportation Needs: Not on file   Physical Activity: Not on file   Stress: Not on file   Social Connections: Not on file   Intimate Partner Violence: Not on file   Housing Stability: Not on file       Current Outpatient Medications:   •  calcium carbonate-vitamin D (OSCAL-D) 500 mg-200 units per tablet, Take 1 tablet by mouth daily with breakfast, Disp: 30 tablet, Rfl: 5  •  Cetirizine HCl (ZYRTEC PO), Take by mouth as needed, Disp: , Rfl:   •  fluticasone (FLONASE) 50 mcg/act nasal spray, 1 spray into each nostril daily (Patient taking differently: 1 spray into each nostril if needed), Disp: 16 g, Rfl: 0  •  levothyroxine 50 mcg tablet, Take 1 tablet (50 mcg total) by mouth daily, Disp: 90 tablet, Rfl: 1  •  mometasone (ELOCON) 0.1 % cream, Apply topically daily, Disp: 60 g, Rfl: 0  •  pyridoxine (VITAMIN B6) 100 mg tablet, Vitamin B6, Disp: , Rfl:   •  rOPINIRole (REQUIP) 0.5 mg tablet, Take 1 tablet (0.5 mg total) by mouth daily at bedtime, Disp: 90 tablet, Rfl: 1  •  SUMAtriptan (IMITREX) 50 mg tablet, Take 1 tablet (50 mg total) by mouth once as needed for migraine for up to 36 doses, Disp: 9 tablet, Rfl: 3  •  cholecalciferol (VITAMIN D3) 1,000 units tablet, Take 1 tablet (1,000  Units total) by mouth daily (Patient not taking: Reported on 1/10/2024), Disp: 30 tablet, Rfl: 5  Allergies   Allergen Reactions   • Seasonal Ic [Cholestatin] Nasal Congestion     Vitals:    11/26/24 1014   BP: 134/78   Pulse: 69   Temp: (!) 97.3 °F (36.3 °C)   SpO2: 98%       Physical Exam  Vitals reviewed.   Constitutional:       General: She is not in acute distress.     Appearance: Normal appearance. She is normal weight. She is not ill-appearing or toxic-appearing.   HENT:      Head: Normocephalic and atraumatic.   Eyes:      General: No scleral icterus.  Cardiovascular:      Rate and Rhythm: Normal rate.   Pulmonary:      Effort: Pulmonary effort is normal.   Chest:   Breasts:     Right: Normal.      Left: Normal.      Comments: Breasts are smooth and symmetric bilaterally. There are no dominant masses, nodules, skin changes or tenderness on exam. I do not appreciate any adenopathy.  Musculoskeletal:         General: No swelling or tenderness. Normal range of motion.      Cervical back: Normal range of motion and neck supple.   Lymphadenopathy:      Cervical: No cervical adenopathy.      Upper Body:      Right upper body: No supraclavicular, axillary or pectoral adenopathy.      Left upper body: No supraclavicular, axillary or pectoral adenopathy.   Skin:     General: Skin is warm and dry.      Coloration: Skin is not jaundiced.      Findings: No erythema.   Neurological:      General: No focal deficit present.      Mental Status: She is alert and oriented to person, place, and time.   Psychiatric:         Mood and Affect: Mood normal.         Behavior: Behavior normal.         Thought Content: Thought content normal.         Judgment: Judgment normal.

## 2024-11-26 NOTE — TELEPHONE ENCOUNTER
Genetics Follow Up:  Katelyn called to discuss her options for updated genetic testing.     Katelyn expressed concern because the [facility] laboratory does not currently have FDA approval.  We discussed that all genetic testing labs at this point in time do not have FDA approval. Analytical validity is analyzed by CLIA (Clinical Laboratory Improvement Amendment). We reviewed that [facility] does possess CLIA certification.  Come April 2025, genetic testing laboratories will have to submit for FDA approval.     We reviewed that the [facility] screening delivers >99% sensitivity and specificity for single nucleotide variants and insertions and deletions (up to 20 bp). Thus, it is likely her BRCA1 pathogenic variant is legitimate. We discussed the slim possibility that a sample collection error took place.     After speaking with her family, Katelyn learned that two paternal relatives may have had breast cancer. We will update her pedigree accordingly.     Genetic testing is indicated for Katelyn based on the following criteria: Meets NCCN V2.2025 Testing Criteria for High-Penetrance Breast Cancer Susceptibility Genes: second-degree relative (paternal aunt) with breast cancer and one close blood relative with breast cancer likely diagnosed <51 y/o.     We offered to order a larger hereditary cancer panel through [facility] to confirm the BRCA1 pathogenic variant is present in her sample and assess for pathogenic variants in other cancer susceptibility genes.  We discussed the possibility of variants of uncertain significance and reviewed their clinical implications.     A larger hereditary cancer panel will run through Katelyn's insurance.   Most individuals pay <$100 for hereditary cancer genetic testing. If insurance covers the cost of the testing, individuals may still pay out of pocket secondary to co-pays, co-insurance, or deductibles. If the cost of the testing exceeds $100, the lab will reach out to the patient via phone or  e-mail. The patient will then have the option to proceed with the testing, cancel the testing, or elect the self-pay option of $250. Katelyn verbalized understanding.     We reviewed that if the pathogenic variant is confirmed at [facility], any blood relative could test for the familial variant only free of charge for 90 days after her final report date.     Plan: Patient decided to proceed with a larger hereditary cancer panel through [facility] and provided consent.     Summary:     Sample Collection:  An order was placed and the patient was instructed to go to a [facility] lab for a blood collection    Genetic Testing Preformed: [employer]: Cancer® +RNAinsight® (60 genes): APC, SALENA, AXIN2, BAP1, BARD1, BMPR1A, BRCA1, BRCA2, BRIP1, CDH1, CDK4, CDKN1B, CDKN2A, CHEK2, CTNNA1, DICER1, EGLN1, EPCAM, FH, FLCN, GREM1, HOXB13, KIF1B, KIT, MAX, MBD4, MEN1, MET, MITF, MLH1, MSH2, MSH3, MSH6, MUTYH, NF1, NTHL1, PALB2, PDGFRA PMS2, POLD1, POLE, POT1, PTEN, RAD51C, RAD51D, RB1, RET, SDHA, SDHAF2, SDHB, SDHC, SDHD, SMAD4, SMARCA4, STK11, GCPT312, TP53, TSC1, TSC2, VHL      Result Call Information:  In the event that we need to reach Katelyn via telephone:  I confirmed the patient's mobile number on file as the best number to call with results  I confirmed with the patient that we can leave a voicemail on her mobile number    Results take approximately 2-3 weeks to complete once test is started.    Katelyn will be notified via [facility] once results are available.      Additional recommendations for surveillance/medical management will be made pending genetic test results.

## 2024-11-26 NOTE — ASSESSMENT & PLAN NOTE
We will plan on annual 3D mammogram staggered with annual breast MRI.  She is advised to have a low threshold for any changes on self-exam.  I will see her again in 1 year for another exam.

## 2024-11-26 NOTE — ASSESSMENT & PLAN NOTE
I have discussed the implications of carrying a BRCA1 mutation.  Although statistically BRCA1 carriers have ~60% lifetime risk for breast cancer, her individual TC lifetime score is 30%.  I have discussed the importance of supplemental breast imaging for early detection, and she is agreeable to having breast MRI performed.  We did discuss risk-reduction including prophylactic mastectomies, but she is not interested in any preventative measures at this time. I have also reviewed her increased risk for ovarian cancer and discussed that the standard of care is to undergo risk-reducing BSO.  I have encouraged her to discuss testing with her children and siblings, and reviewed that males can also carry the BRCA1 gene.  Male carriers have a high risk of prostate cancer.  In addition, she carries a very small risk for pancreatic or peritoneal cancer, although neither of these are present in her family history. She will plan to see her gynecologist yearly, and I will see her yearly as well, so that she is receiving 2 clinical breast exams annually.

## 2024-11-26 NOTE — ASSESSMENT & PLAN NOTE
Given her positive BRCA1 status, I will refer her to Gynecologic Oncology for discussion of risk-reducing BSO.

## 2024-11-26 NOTE — PROGRESS NOTES
Chart rvw'd on 11/26/2024      Referred by:  Siena FELIZ    Diagnosis:  BRCA 1 positive   High risk of ovarian cancer    Genetic Testing Date:  8/14/2024

## 2024-11-26 NOTE — TELEPHONE ENCOUNTER
LVM asking Katelyn to please call [phone] (option 3) at her earliest convenience to discuss options for testing with a larger hereditary cancer panel.

## 2024-11-26 NOTE — PROGRESS NOTES
Surgical Oncology Consult       [facility] [place]  [address] [facility] [address]  [phone]    Katelyn Warnermaximilian  1965  [id number]  [facility] [place]  [address] [facility] [address]  [phone]    1. BRCA1 positive  Assessment & Plan:  I have discussed the implications of carrying a BRCA1 mutation.  Although statistically BRCA1 carriers have ~60% lifetime risk for breast cancer, her individual TC lifetime score is 30%.  I have discussed the importance of supplemental breast imaging for early detection, and she is agreeable to having breast MRI performed.  We did discuss risk-reduction including prophylactic mastectomies, but she is not interested in any preventative measures at this time. I have also reviewed her increased risk for ovarian cancer and discussed that the standard of care is to undergo risk-reducing BSO.  I have encouraged her to discuss testing with her children and siblings, and reviewed that males can also carry the BRCA1 gene.  Male carriers have a high risk of prostate cancer.  In addition, she carries a very small risk for pancreatic or peritoneal cancer, although neither of these are present in her family history. She will plan to see her gynecologist yearly, and I will see her yearly as well, so that she is receiving 2 clinical breast exams annually.  Orders:  -     Ambulatory referral to Surgical Oncology  -     MRI breast bilateral w and wo contrast w cad; Future; Expected date: 02/01/2025  -     BUN; Future; Expected date: 02/01/2025  -     Creatinine, serum; Future; Expected date: 02/01/2025  -     Ambulatory Referral to Gynecologic Oncology; Future  2. At high risk for breast cancer  Assessment & Plan:  We will plan on annual 3D mammogram staggered with annual breast MRI.  She is advised to have a low threshold for any  changes on self-exam.  I will see her again in 1 year for another exam.  Orders:  -     MRI breast bilateral w and wo contrast w cad; Future; Expected date: 02/01/2025  3. High risk of ovarian cancer  Assessment & Plan:  Given her positive BRCA1 status, I will refer her to Gynecologic Oncology for discussion of risk-reducing BSO.  Orders:  -     Ambulatory Referral to Gynecologic Oncology; Future  4. Breast cancer screening other than mammogram  -     MRI breast bilateral w and wo contrast w cad; Future; Expected date: 02/01/2025  5. Visit for screening mammogram  -     Mammo screening bilateral w 3d and cad; Future; Expected date: 07/30/2025      Chief Complaint   Patient presents with    New Patient Visit       Return in about 1 year (around 11/26/2025) for Office Visit, Imaging - See orders.        History of Present Illness: This is a 58 y/o female who presents today in consultation for a BRCA1 mutation discovered on recent genetic screening.  She reports that this came a surprise to her, as she has no family history of ovarian cancer, and only minimal family history of breast cancer.  Her father had colon cancer at the age of 74.  She reports a paternal aunt with breast cancer in her 70's and a paternal cousin with breast cancer in her 50's.  Both of these were presumably found very early, as they were reportedly treated with hormone therapy alone.  The patient states that none of her relatives have undergone genetic testing. The patient denies any history of any breast biopsies, surgeries or infections.  She denies any changes on self-exam, and has not noticed any breast lumps, skin changes or tenderness.  She reports menarche at age 11 and had her first child at 26.  She experienced menopause at 55 and has never used HRT.  I have calculated her lifetime TC risk to be 30%.        Review of Systems   Constitutional:  Negative for activity change, appetite change, fatigue and unexpected weight change.   HENT:  Negative.     Respiratory: Negative.     Cardiovascular: Negative.    Gastrointestinal: Negative.    Musculoskeletal: Negative.    Skin: Negative.    Neurological: Negative.  Negative for dizziness and headaches.   Hematological: Negative.    Psychiatric/Behavioral: Negative.                 Patient Active Problem List   Diagnosis    Acquired hypothyroidism    Classic migraine with aura    Spider veins of limb    Vitamin D deficiency    Allergic contact dermatitis    BRCA1 positive    High risk of ovarian cancer    At high risk for breast cancer     Past Medical History:   Diagnosis Date    Allergic rhinitis due to pollen     last assessed 11/11/13    Asthma     Common migraine without aura     last assessed 06/03/14    Seasonal allergies     last assessed 11/11/13     Past Surgical History:   Procedure Laterality Date    CARPAL TUNNEL RELEASE Bilateral 2003    TONSILLECTOMY       Family History   Problem Relation Age of Onset    Coronary artery disease Mother     Diabetes Mother         in her 40's    Hypertension Mother     Heart failure Mother     Heart disease Mother     Colon cancer Father 74    Thyroid disease Father     Skin cancer Father     Bone cancer Maternal Aunt 10    Breast cancer Paternal Aunt         70's, treated with hormone therapy alone    Breast cancer Paternal Cousin         50's, treated with hormone therapy alone     Social History     Socioeconomic History    Marital status: /Civil Union     Spouse name: Not on file    Number of children: Not on file    Years of education: Not on file    Highest education level: Not on file   Occupational History    Occupation: clerical occupation   Tobacco Use    Smoking status: Never    Smokeless tobacco: Never   Vaping Use    Vaping status: Never Used   Substance and Sexual Activity    Alcohol use: Yes     Comment: occassionally    Drug use: No    Sexual activity: Yes     Partners: Male   Other Topics Concern    Not on file   Social History  Narrative    Not on file     Social Drivers of Health     Financial Resource Strain: Not on file   Food Insecurity: Not on file   Transportation Needs: Not on file   Physical Activity: Not on file   Stress: Not on file   Social Connections: Not on file   Intimate Partner Violence: Not on file   Housing Stability: Not on file       Current Outpatient Medications:     calcium carbonate-vitamin D (OSCAL-D) 500 mg-200 units per tablet, Take 1 tablet by mouth daily with breakfast, Disp: 30 tablet, Rfl: 5    Cetirizine HCl (ZYRTEC PO), Take by mouth as needed, Disp: , Rfl:     fluticasone (FLONASE) 50 mcg/act nasal spray, 1 spray into each nostril daily (Patient taking differently: 1 spray into each nostril if needed), Disp: 16 g, Rfl: 0    levothyroxine 50 mcg tablet, Take 1 tablet (50 mcg total) by mouth daily, Disp: 90 tablet, Rfl: 1    mometasone (ELOCON) 0.1 % cream, Apply topically daily, Disp: 60 g, Rfl: 0    pyridoxine (VITAMIN B6) 100 mg tablet, Vitamin B6, Disp: , Rfl:     rOPINIRole (REQUIP) 0.5 mg tablet, Take 1 tablet (0.5 mg total) by mouth daily at bedtime, Disp: 90 tablet, Rfl: 1    SUMAtriptan (IMITREX) 50 mg tablet, Take 1 tablet (50 mg total) by mouth once as needed for migraine for up to 36 doses, Disp: 9 tablet, Rfl: 3    cholecalciferol (VITAMIN D3) 1,000 units tablet, Take 1 tablet (1,000 Units total) by mouth daily (Patient not taking: Reported on 1/10/2024), Disp: 30 tablet, Rfl: 5  Allergies   Allergen Reactions    Seasonal Ic [Cholestatin] Nasal Congestion     Vitals:    11/26/24 1014   BP: 134/78   Pulse: 69   Temp: (!) 97.3 °F (36.3 °C)   SpO2: 98%       Physical Exam  Vitals reviewed.   Constitutional:       General: She is not in acute distress.     Appearance: Normal appearance. She is normal weight. She is not ill-appearing or toxic-appearing.   HENT:      Head: Normocephalic and atraumatic.   Eyes:      General: No scleral icterus.  Cardiovascular:      Rate and Rhythm: Normal rate.    Pulmonary:      Effort: Pulmonary effort is normal.   Chest:   Breasts:     Right: Normal.      Left: Normal.      Comments: Breasts are smooth and symmetric bilaterally. There are no dominant masses, nodules, skin changes or tenderness on exam. I do not appreciate any adenopathy.  Musculoskeletal:         General: No swelling or tenderness. Normal range of motion.      Cervical back: Normal range of motion and neck supple.   Lymphadenopathy:      Cervical: No cervical adenopathy.      Upper Body:      Right upper body: No supraclavicular, axillary or pectoral adenopathy.      Left upper body: No supraclavicular, axillary or pectoral adenopathy.   Skin:     General: Skin is warm and dry.      Coloration: Skin is not jaundiced.      Findings: No erythema.   Neurological:      General: No focal deficit present.      Mental Status: She is alert and oriented to person, place, and time.   Psychiatric:         Mood and Affect: Mood normal.         Behavior: Behavior normal.         Thought Content: Thought content normal.         Judgment: Judgment normal.

## 2024-11-29 ENCOUNTER — APPOINTMENT (OUTPATIENT)
Dept: LAB | Age: 59
End: 2024-11-29
Payer: COMMERCIAL

## 2024-11-29 DIAGNOSIS — Z15.09 BRCA1 GENE MUTATION POSITIVE: ICD-10-CM

## 2024-11-29 DIAGNOSIS — Z15.01 BRCA1 GENE MUTATION POSITIVE: ICD-10-CM

## 2024-11-29 DIAGNOSIS — Z80.3 FHX: BREAST CANCER: ICD-10-CM

## 2024-11-29 DIAGNOSIS — Z80.0 FHX: COLON CANCER: ICD-10-CM

## 2024-11-29 PROCEDURE — 36415 COLL VENOUS BLD VENIPUNCTURE: CPT

## 2024-12-09 LAB
GENE DIS ANL INTERP-IMP: ABNORMAL
INTERPRETATION: ABNORMAL

## 2024-12-10 ENCOUNTER — RESULTS FOLLOW-UP (OUTPATIENT)
Dept: GENETICS | Facility: CLINIC | Age: 59
End: 2024-12-10

## 2025-01-20 ENCOUNTER — TELEPHONE (OUTPATIENT)
Dept: GYNECOLOGIC ONCOLOGY | Facility: CLINIC | Age: 60
End: 2025-01-20

## 2025-01-20 NOTE — TELEPHONE ENCOUNTER
Called patient due to canceling her consult appointment with provider. I have instructed patient to call the office back and provided the office number.

## 2025-01-31 DIAGNOSIS — G25.81 RESTLESS LEG SYNDROME: ICD-10-CM

## 2025-01-31 RX ORDER — ROPINIROLE 0.5 MG/1
0.5 TABLET, FILM COATED ORAL
Qty: 90 TABLET | Refills: 1 | Status: SHIPPED | OUTPATIENT
Start: 2025-01-31

## 2025-02-07 DIAGNOSIS — E03.9 ACQUIRED HYPOTHYROIDISM: ICD-10-CM

## 2025-02-07 RX ORDER — LEVOTHYROXINE SODIUM 50 UG/1
50 TABLET ORAL DAILY
Qty: 90 TABLET | Refills: 1 | Status: SHIPPED | OUTPATIENT
Start: 2025-02-07

## 2025-02-08 ENCOUNTER — HOSPITAL ENCOUNTER (OUTPATIENT)
Dept: RADIOLOGY | Facility: HOSPITAL | Age: 60
Discharge: HOME | End: 2025-02-08
Payer: COMMERCIAL

## 2025-02-08 DIAGNOSIS — Z12.39 BREAST CANCER SCREENING OTHER THAN MAMMOGRAM: ICD-10-CM

## 2025-02-08 DIAGNOSIS — Z15.09 BRCA1 POSITIVE: ICD-10-CM

## 2025-02-08 DIAGNOSIS — Z15.01 BRCA1 POSITIVE: ICD-10-CM

## 2025-02-08 DIAGNOSIS — Z91.89 AT HIGH RISK FOR BREAST CANCER: ICD-10-CM

## 2025-02-08 PROCEDURE — A9585 GADOBUTROL INJECTION: HCPCS

## 2025-02-08 PROCEDURE — C8908 MRI W/O FOL W/CONT, BREAST,: HCPCS

## 2025-02-08 PROCEDURE — C8937 CAD BREAST MRI: HCPCS

## 2025-02-08 RX ORDER — GADOBUTROL 604.72 MG/ML
6 INJECTION INTRAVENOUS
Status: COMPLETED | OUTPATIENT
Start: 2025-02-08 | End: 2025-02-08

## 2025-02-08 RX ADMIN — GADOBUTROL 6 ML: 604.72 INJECTION INTRAVENOUS at 14:44

## 2025-02-14 NOTE — PROGRESS NOTES
Name: Katelyn Padilla      : 1965      MRN: [id number]  Encounter Provider: Lyudmila Bear MD  Encounter Date: 2025   Encounter department: [facility] [place]  :  Assessment & Plan  BRCA1 positive  60yo with hypothyroidism, migraines and finding of BRCA1 mutation presents for consultation.    She has a diagnosis of BRCA1 mutation in the setting of routine testing  I have discussed in detail with the patient the results of her diagnostic testing, her differential diagnosis and treatment options, and the benefits and risks of these. She has a good understanding.    She and I have discussed her risks for developing ovarian cancer to be about 15-40% during her lifetime, with BRCA1+ women tending to develop cancer at younger ages, in the late 30's and 40's and BRCA2+ women having risk to develop cancer into the 70's.  Her options for follow up include:   1) Screening with twice yearly , TVUS, and physical exam starting at age 30. Studies have not necessarily confirmed the effectiveness of this regimen. One study (UKFOCSS) showed a PPV of 25.5% with NPV 99.9%  2) Chemoprophylaxis with OCP's.   3) Prophylactic removal of the tubes and ovaries to decrease the risk of ovarian cancer by over 90%. The surgery is not entirely protective against the development of primary peritoneal cancer.      Discussed the limited data that shows there may be a slight increase in risk of serous endometrial cancer above the expected rate in BRCA mutation carriers. We further discussed the risks and benefits including surgical risks of additional procedure with hysterectomy at the time of rrBSO.     Patient is unsure how to proceed at this time. She would like time to think about her options. We discussed the surgery itself in the event she wishes to proceed.  We discussed the advantage of a laparoscopic method in terms of length of hospitalization and overall recovery being reduced by a factor of  2 or 3 and the reduced amount of post-operative incision pain.  She understands that the risks of major complications are approximately 5% and include but are not limited to hemorrhage requiring transfusion, intestinal/urinary tract injury, wound healing impairment, infection, thrombo-embolic complications, cardio-pulmonary and renal complications. She also understands the possibility of conversion to laparotomy for issues related to safety or findings suggesting more advanced disease than expected where the surgery could not be technically completed laparoscopically.    Consents signed  She will call to schedule or cancel. If she decides to forgo surgical intervention, she should continue with routine gyn exams/visits.     Orders:  •  Ambulatory Referral to Gynecologic Oncology  •  Case request operating room: SALPINGO-OOPHORECTOMY, LAPAROSCOPIC; Standing  •  CBC and Platelet; Future  •  Basic metabolic panel; Future  •  Type and screen; Future  •  EKG 12 lead; Future    High risk of ovarian cancer    Orders:  •  Ambulatory Referral to Gynecologic Oncology            History of Present Illness   Reason for Visit / CC: BRCA1   Katelyn Padilla is a 59 y.o. female   58yo with hypothyroidism, migraines and finding of BRCA1 mutation presents for consultation. Pt udnerwent routine testing through [employer] program and was found to have pathogenic mutation. Family history significant for colon, breast and bone cancer. Pt reports menopause in mid 50s without subsequent bleeding. She has seen surg onc for breast surveillance. Pt denies abdominal bloating/pain/cramping. No vaginal bleeding/discharge. No changes in BM/urination.           Pertinent Medical History         Review of Systems   Constitutional: Negative.    HENT: Negative.     Eyes: Negative.    Respiratory: Negative.     Cardiovascular: Negative.    Gastrointestinal: Negative.    Endocrine: Negative.    Genitourinary: Negative.    Musculoskeletal: Negative.   "  Neurological: Negative.    Psychiatric/Behavioral: Negative.      A complete review of systems is negative other than that noted above in the HPI.       Objective   /78   Pulse 75   Temp (!) 96.6 °F (35.9 °C) (Temporal)   Ht 5' 4\" (1.626 m)   Wt 62.1 kg (137 lb)   SpO2 99%   BMI 23.52 kg/m²     Body mass index is 23.52 kg/m².  Pain Screening:     ECOG ECOG Performance Status: 0 - Fully active, able to carry on all pre-disease performance without restriction   Physical Exam  HENT:      Head: Normocephalic and atraumatic.      Nose: Nose normal.   Cardiovascular:      Rate and Rhythm: Normal rate and regular rhythm.   Pulmonary:      Effort: Pulmonary effort is normal.   Abdominal:      General: There is no distension.      Palpations: Abdomen is soft. There is no mass.   Genitourinary:     Comments: defer  Musculoskeletal:         General: No swelling. Normal range of motion.      Cervical back: Normal range of motion.   Skin:     General: Skin is warm and dry.   Neurological:      General: No focal deficit present.      Mental Status: She is alert.   Psychiatric:         Mood and Affect: Mood normal.          Labs: I have reviewed pertinent labs.   No results found for: \"\"  Lab Results   Component Value Date/Time    Potassium 4.1 06/19/2024 06:02 AM    Chloride 100 06/19/2024 06:02 AM    CO2 31 06/19/2024 06:02 AM    BUN 20 06/19/2024 06:02 AM    Creatinine 0.67 06/19/2024 06:02 AM    Calcium 9.0 06/19/2024 06:02 AM    AST 21 06/19/2024 06:02 AM    ALT 8 06/19/2024 06:02 AM    Alkaline Phosphatase 55 06/19/2024 06:02 AM    eGFR 100 06/19/2024 06:02 AM     Lab Results   Component Value Date/Time    White Blood Cell Count 5.0 06/19/2024 06:02 AM    Hemoglobin 13.1 06/19/2024 06:02 AM    HCT 38.9 06/19/2024 06:02 AM    MCV 93 06/19/2024 06:02 AM    Platelet Count 198 06/19/2024 06:02 AM     Lab Results   Component Value Date/Time    Neutrophils (Absolute) 2.3 06/19/2024 06:02 AM        " "Trend:  No results found for: \"\"            "

## 2025-02-14 NOTE — ASSESSMENT & PLAN NOTE
58yo with hypothyroidism, migraines and finding of BRCA1 mutation presents for consultation.    She has a diagnosis of BRCA1 mutation in the setting of routine testing  I have discussed in detail with the patient the results of her diagnostic testing, her differential diagnosis and treatment options, and the benefits and risks of these. She has a good understanding.    She and I have discussed her risks for developing ovarian cancer to be about 15-40% during her lifetime, with BRCA1+ women tending to develop cancer at younger ages, in the late 30's and 40's and BRCA2+ women having risk to develop cancer into the 70's.  Her options for follow up include:   1) Screening with twice yearly , TVUS, and physical exam starting at age 30. Studies have not necessarily confirmed the effectiveness of this regimen. One study (UKFOCSS) showed a PPV of 25.5% with NPV 99.9%  2) Chemoprophylaxis with OCP's.   3) Prophylactic removal of the tubes and ovaries to decrease the risk of ovarian cancer by over 90%. The surgery is not entirely protective against the development of primary peritoneal cancer.      Discussed the limited data that shows there may be a slight increase in risk of serous endometrial cancer above the expected rate in BRCA mutation carriers. We further discussed the risks and benefits including surgical risks of additional procedure with hysterectomy at the time of rrBSO.     Patient is unsure how to proceed at this time. She would like time to think about her options. We discussed the surgery itself in the event she wishes to proceed.  We discussed the advantage of a laparoscopic method in terms of length of hospitalization and overall recovery being reduced by a factor of 2 or 3 and the reduced amount of post-operative incision pain.  She understands that the risks of major complications are approximately 5% and include but are not limited to hemorrhage requiring transfusion, intestinal/urinary tract injury,  wound healing impairment, infection, thrombo-embolic complications, cardio-pulmonary and renal complications. She also understands the possibility of conversion to laparotomy for issues related to safety or findings suggesting more advanced disease than expected where the surgery could not be technically completed laparoscopically.    Consents signed  She will call to schedule or cancel. If she decides to forgo surgical intervention, she should continue with routine gyn exams/visits.     Orders:  •  Ambulatory Referral to Gynecologic Oncology  •  Case request operating room: SALPINGO-OOPHORECTOMY, LAPAROSCOPIC; Standing  •  CBC and Platelet; Future  •  Basic metabolic panel; Future  •  Type and screen; Future  •  EKG 12 lead; Future

## 2025-02-17 ENCOUNTER — CONSULT (OUTPATIENT)
Dept: GYNECOLOGIC ONCOLOGY | Facility: CLINIC | Age: 60
End: 2025-02-17
Payer: COMMERCIAL

## 2025-02-17 VITALS
BODY MASS INDEX: 23.39 KG/M2 | SYSTOLIC BLOOD PRESSURE: 120 MMHG | DIASTOLIC BLOOD PRESSURE: 78 MMHG | HEART RATE: 75 BPM | TEMPERATURE: 96.6 F | WEIGHT: 137 LBS | OXYGEN SATURATION: 99 % | HEIGHT: 64 IN

## 2025-02-17 DIAGNOSIS — Z15.09 BRCA1 POSITIVE: Primary | ICD-10-CM

## 2025-02-17 DIAGNOSIS — Z15.01 BRCA1 POSITIVE: Primary | ICD-10-CM

## 2025-02-17 DIAGNOSIS — Z91.89 HIGH RISK OF OVARIAN CANCER: ICD-10-CM

## 2025-02-17 PROCEDURE — 99244 OFF/OP CNSLTJ NEW/EST MOD 40: CPT | Performed by: OBSTETRICS & GYNECOLOGY

## 2025-02-17 RX ORDER — IPRATROPIUM BROMIDE 21 UG/1
SPRAY, METERED NASAL
COMMUNITY
Start: 2025-01-07

## 2025-02-17 NOTE — LETTER
2025     TRAY Pathak  [address]  [address]  [facility] [address]    Patient: Katelyn Padilla   YOB: 1965   Date of Visit: 2025       Dear Dr. Garcia:    Thank you for referring Katelyn Padilla to me for evaluation. Below are my notes for this consultation.    If you have questions, please do not hesitate to call me. I look forward to following your patient along with you.         Sincerely,        Lyudmila Bear MD        CC: No Recipients    Lyudmila Bear MD  2025  9:47 AM  Sign when Signing Visit  Name: Katelyn Padilla      : 1965      MRN: [id number]  Encounter Provider: Lyudmila Bear MD  Encounter Date: 2025   Encounter department: [facility]  :  Assessment & Plan  BRCA1 positive  60yo with hypothyroidism, migraines and finding of BRCA1 mutation presents for consultation.    She has a diagnosis of BRCA1 mutation in the setting of routine testing  I have discussed in detail with the patient the results of her diagnostic testing, her differential diagnosis and treatment options, and the benefits and risks of these. She has a good understanding.    She and I have discussed her risks for developing ovarian cancer to be about 15-40% during her lifetime, with BRCA1+ women tending to develop cancer at younger ages, in the late 30's and 40's and BRCA2+ women having risk to develop cancer into the 70's.  Her options for follow up include:   1) Screening with twice yearly , TVUS, and physical exam starting at age 30. Studies have not necessarily confirmed the effectiveness of this regimen. One study (UKFOCSS) showed a PPV of 25.5% with NPV 99.9%  2) Chemoprophylaxis with OCP's.   3) Prophylactic removal of the tubes and ovaries to decrease the risk of ovarian cancer by over 90%. The surgery is not entirely protective against the development of primary peritoneal cancer.      Discussed the limited data  that shows there may be a slight increase in risk of serous endometrial cancer above the expected rate in BRCA mutation carriers. We further discussed the risks and benefits including surgical risks of additional procedure with hysterectomy at the time of rrBSO.     Patient is unsure how to proceed at this time. She would like time to think about her options. We discussed the surgery itself in the event she wishes to proceed.  We discussed the advantage of a laparoscopic method in terms of length of hospitalization and overall recovery being reduced by a factor of 2 or 3 and the reduced amount of post-operative incision pain.  She understands that the risks of major complications are approximately 5% and include but are not limited to hemorrhage requiring transfusion, intestinal/urinary tract injury, wound healing impairment, infection, thrombo-embolic complications, cardio-pulmonary and renal complications. She also understands the possibility of conversion to laparotomy for issues related to safety or findings suggesting more advanced disease than expected where the surgery could not be technically completed laparoscopically.    Consents signed  She will call to schedule or cancel. If she decides to forgo surgical intervention, she should continue with routine gyn exams/visits.     Orders:  •  Ambulatory Referral to Gynecologic Oncology  •  Case request operating room: SALPINGO-OOPHORECTOMY, LAPAROSCOPIC; Standing  •  CBC and Platelet; Future  •  Basic metabolic panel; Future  •  Type and screen; Future  •  EKG 12 lead; Future    High risk of ovarian cancer    Orders:  •  Ambulatory Referral to Gynecologic Oncology            History of Present Illness  Reason for Visit / CC: BRCA1   Katelyn Padilla is a 59 y.o. female   58yo with hypothyroidism, migraines and finding of BRCA1 mutation presents for consultation. Pt udnerwent routine testing through [employer] program and was found to have pathogenic mutation.  "Family history significant for colon, breast and bone cancer. Pt reports menopause in mid 50s without subsequent bleeding. She has seen surg onc for breast surveillance. Pt denies abdominal bloating/pain/cramping. No vaginal bleeding/discharge. No changes in BM/urination.           Pertinent Medical History       Review of Systems   Constitutional: Negative.    HENT: Negative.     Eyes: Negative.    Respiratory: Negative.     Cardiovascular: Negative.    Gastrointestinal: Negative.    Endocrine: Negative.    Genitourinary: Negative.    Musculoskeletal: Negative.    Neurological: Negative.    Psychiatric/Behavioral: Negative.      A complete review of systems is negative other than that noted above in the HPI.       Objective  /78   Pulse 75   Temp (!) 96.6 °F (35.9 °C) (Temporal)   Ht 5' 4\" (1.626 m)   Wt 62.1 kg (137 lb)   SpO2 99%   BMI 23.52 kg/m²     Body mass index is 23.52 kg/m².  Pain Screening:     ECOG ECOG Performance Status: 0 - Fully active, able to carry on all pre-disease performance without restriction   Physical Exam  HENT:      Head: Normocephalic and atraumatic.      Nose: Nose normal.   Cardiovascular:      Rate and Rhythm: Normal rate and regular rhythm.   Pulmonary:      Effort: Pulmonary effort is normal.   Abdominal:      General: There is no distension.      Palpations: Abdomen is soft. There is no mass.   Genitourinary:     Comments: defer  Musculoskeletal:         General: No swelling. Normal range of motion.      Cervical back: Normal range of motion.   Skin:     General: Skin is warm and dry.   Neurological:      General: No focal deficit present.      Mental Status: She is alert.   Psychiatric:         Mood and Affect: Mood normal.          Labs: I have reviewed pertinent labs.   No results found for: \"\"  Lab Results   Component Value Date/Time    Potassium 4.1 06/19/2024 06:02 AM    Chloride 100 06/19/2024 06:02 AM    CO2 31 06/19/2024 06:02 AM    BUN 20 06/19/2024 " "06:02 AM    Creatinine 0.67 06/19/2024 06:02 AM    Calcium 9.0 06/19/2024 06:02 AM    AST 21 06/19/2024 06:02 AM    ALT 8 06/19/2024 06:02 AM    Alkaline Phosphatase 55 06/19/2024 06:02 AM    eGFR 100 06/19/2024 06:02 AM     Lab Results   Component Value Date/Time    White Blood Cell Count 5.0 06/19/2024 06:02 AM    Hemoglobin 13.1 06/19/2024 06:02 AM    HCT 38.9 06/19/2024 06:02 AM    MCV 93 06/19/2024 06:02 AM    Platelet Count 198 06/19/2024 06:02 AM     Lab Results   Component Value Date/Time    Neutrophils (Absolute) 2.3 06/19/2024 06:02 AM        Trend:  No results found for: \"\"              "

## 2025-02-19 ENCOUNTER — OFFICE VISIT (OUTPATIENT)
Dept: INTERNAL MEDICINE CLINIC | Age: 60
End: 2025-02-19
Payer: COMMERCIAL

## 2025-02-19 VITALS
HEART RATE: 61 BPM | DIASTOLIC BLOOD PRESSURE: 82 MMHG | BODY MASS INDEX: 23.22 KG/M2 | OXYGEN SATURATION: 99 % | TEMPERATURE: 98.6 F | HEIGHT: 64 IN | WEIGHT: 136 LBS | SYSTOLIC BLOOD PRESSURE: 120 MMHG

## 2025-02-19 DIAGNOSIS — Z15.02 BRCA GENE MUTATION POSITIVE IN FEMALE: ICD-10-CM

## 2025-02-19 DIAGNOSIS — Z15.01 BRCA GENE MUTATION POSITIVE IN FEMALE: ICD-10-CM

## 2025-02-19 DIAGNOSIS — Z15.09 BRCA GENE MUTATION POSITIVE IN FEMALE: ICD-10-CM

## 2025-02-19 DIAGNOSIS — Z91.018 FOOD ALLERGY: ICD-10-CM

## 2025-02-19 DIAGNOSIS — E03.9 ACQUIRED HYPOTHYROIDISM: Primary | ICD-10-CM

## 2025-02-19 DIAGNOSIS — G43.109 MIGRAINE WITH AURA AND WITHOUT STATUS MIGRAINOSUS, NOT INTRACTABLE: ICD-10-CM

## 2025-02-19 PROCEDURE — 99214 OFFICE O/P EST MOD 30 MIN: CPT | Performed by: INTERNAL MEDICINE

## 2025-02-19 RX ORDER — EPINEPHRINE 0.15 MG/.3ML
0.15 INJECTION INTRAMUSCULAR ONCE
Qty: 0.3 ML | Refills: 0 | Status: SHIPPED | OUTPATIENT
Start: 2025-02-19 | End: 2025-02-19

## 2025-02-19 NOTE — ASSESSMENT & PLAN NOTE
TSH is normal  Orders:    TSH, 3rd generation; Future    Comprehensive metabolic panel; Future    Lipid panel; Future

## 2025-02-19 NOTE — ASSESSMENT & PLAN NOTE
Is BRCA positive she was seen by the GYN they are planning to do salpingo-oophorectomy, she is getting mammogram and MRI of the breast and I recommended colonoscopy regularly.  Skin examination for melanoma regularly  Orders:    TSH, 3rd generation; Future    Comprehensive metabolic panel; Future    Lipid panel; Future

## 2025-02-19 NOTE — PROGRESS NOTES
Name: Katelyn Padilla      : 1965      MRN: [id number]  Encounter Provider: Edwar Vyas MD  Encounter Date: 2025   Encounter department: [facility]  :  Assessment & Plan  Acquired hypothyroidism  TSH is normal  Orders:    TSH, 3rd generation; Future    Comprehensive metabolic panel; Future    Lipid panel; Future    Migraine with aura and without status migrainosus, not intractable  Start of the year the migraines were much worst when she was having some problem with the viral gastroenteritis but overall she is back to her usual controlled       Food allergy  Patient has a allergy to some food was seen by the allergist and immunologist and recently she had a severe reaction which associated with the swelling of the face significant hives and generalized weakness there was no shortness of breath she took 4 Benadryl which helped her.  I highly recommend that she should keep the EpiPen with her and follow-up with the allergist she think it could be secondary to sesame seeds or sesame oil in the food  Orders:    EPINEPHrine (EPIPEN JR) 0.15 mg/0.3 mL SOAJ; Inject 0.3 mL (0.15 mg total) into a muscle once for 1 dose    BRCA gene mutation positive in female  Is BRCA positive she was seen by the GYN they are planning to do salpingo-oophorectomy, she is getting mammogram and MRI of the breast and I recommended colonoscopy regularly.  Skin examination for melanoma regularly  Orders:    TSH, 3rd generation; Future    Comprehensive metabolic panel; Future    Lipid panel; Future           History of Present Illness   This is a very pleasant 59 years young lady who is here today for the regular follow-up    Hypothyroidism hypothyroidism patient's TSH is normal I did not adjust any dose of her levothyroxine she does not have any signs or symptoms of hypothyroidism we will follow-up her TSH in about 4 months and do the dose adjustment as needed.  No side effects of the  "medications  TSH need to be followed last TSH was normal in June patient does not have any symptoms of hypothyroidi    Migraine recently when in January when she had a gas was enteritis she started with the more episodes of severe migraine when she was taking her medications regularly now the migraines are stable and she is not taking Imitrex and also she is doing better, usually she is not having too often migraine headaches but this time it was much worst    Positive BRCA as given above    Anaphylactic reaction almost with the swelling of the face eyes and hives she is followed up by the allergy immunologist I recommend her to keep the EpiPen with her I will give her there prescription and also I recommended her to use Benadryl and keep the Benadryl all time with her      Review of Systems   Constitutional:  Negative for chills and fatigue.   HENT:  Negative for congestion, ear pain, hearing loss, postnasal drip, sinus pressure, sore throat and voice change.    Eyes:  Negative for pain, discharge and visual disturbance.   Respiratory:  Negative for cough, chest tightness and shortness of breath.    Cardiovascular:  Negative for chest pain, palpitations and leg swelling.   Gastrointestinal:  Negative for abdominal pain, blood in stool, diarrhea, nausea and rectal pain.   Genitourinary:  Negative for difficulty urinating, dysuria and urgency.   Musculoskeletal:  Negative for arthralgias and joint swelling.   Skin:  Negative for rash.   Allergic/Immunologic: Negative for environmental allergies and food allergies.   Neurological:  Negative for dizziness, tremors, weakness, numbness and headaches.   Hematological:  Negative for adenopathy.   Psychiatric/Behavioral:  Negative for behavioral problems and hallucinations.        Objective   /82 (BP Location: Left arm, Patient Position: Sitting, Cuff Size: Adult)   Pulse 61   Temp 98.6 °F (37 °C) (Temporal)   Ht 5' 4\" (1.626 m)   Wt 61.7 kg (136 lb)   SpO2 99% " Comment: ra  BMI 23.34 kg/m²      Physical Exam  HENT:      Head: Normocephalic.   Eyes:      Pupils: Pupils are equal, round, and reactive to light.   Cardiovascular:      Rate and Rhythm: Normal rate and regular rhythm.      Heart sounds: No murmur heard.  Pulmonary:      Breath sounds: Normal breath sounds.   Abdominal:      General: Bowel sounds are normal.      Palpations: Abdomen is soft.   Musculoskeletal:      Cervical back: Normal range of motion.   Skin:     General: Skin is warm.   Neurological:      Mental Status: She is alert and oriented to person, place, and time.   Psychiatric:         Behavior: Behavior normal.         Thought Content: Thought content normal.

## 2025-02-19 NOTE — ASSESSMENT & PLAN NOTE
Start of the year the migraines were much worst when she was having some problem with the viral gastroenteritis but overall she is back to her usual controlled

## 2025-02-21 ENCOUNTER — TELEPHONE (OUTPATIENT)
Dept: ADMINISTRATIVE | Facility: OTHER | Age: 60
End: 2025-02-21

## 2025-02-21 NOTE — TELEPHONE ENCOUNTER
----- Message from Pepper STYLES sent at 2/19/2025  1:10 PM EST -----  Regarding: pap  02/19/25 1:10 PM    Hello, our patient Katelyn Padilla has had Pap Smear (HPV) aka Cervical Cancer Screening completed/performed. Please assist in updating the patient chart by pulling the document from labs Tab within Chart Review. The date of service is 08/27/2024.     Thank you,  Pepper Pack MA  [facility]

## 2025-02-21 NOTE — TELEPHONE ENCOUNTER
Upon review of the In Basket request we were able to locate, review, and update the patient chart as requested for Pap Smear (HPV) aka Cervical Cancer Screening.    Any additional questions or concerns should be emailed to the Practice Liaisons via the appropriate education email address, please do not reply via In Basket.    Thank you  Betzaida Benavides MA   [facility]

## 2025-03-03 ENCOUNTER — TELEPHONE (OUTPATIENT)
Dept: GYNECOLOGIC ONCOLOGY | Facility: CLINIC | Age: 60
End: 2025-03-03

## 2025-03-03 NOTE — TELEPHONE ENCOUNTER
Phoned patient, left maching message to contact me to discuss surgery scheduling.  Call back number provided - [phone].

## 2025-03-10 NOTE — TELEPHONE ENCOUNTER
----- Message from Nimco Leon MA sent at 9/13/2022 10:36 AM EDT -----  Regarding: Mammogram  09/13/22 10:36 AM    Hello, our patient Omar Lozada has had Mammogram completed/performed  Please assist in updating the patient chart by pulling the Care Everywhere (CE) document  The date of service is 06/30/2022       Thank you,  Nimco Leon MA  [facility]
09/13/22 12:26 PM     See documentation in the [employer] SmartForm       Debby Gaston MA
Upon review of the In Basket request we were able to locate, review, and update the patient chart as requested for Mammogram     Any additional questions or concerns should be emailed to the Practice Liaisons via [email or web address] email, please do not reply via In Basket      Thank you  Tara Calderón MA
Statement Selected

## 2025-03-12 NOTE — TELEPHONE ENCOUNTER
Phoned patient x 2 - lmm to contact me regarding schedule procedure.  Call back number provided.

## 2025-04-09 ENCOUNTER — TELEPHONE (OUTPATIENT)
Dept: GYNECOLOGIC ONCOLOGY | Facility: CLINIC | Age: 60
End: 2025-04-09

## 2025-04-09 NOTE — TELEPHONE ENCOUNTER
Phoned patient, left machine message offering a sooner date for surgery on 4/17/2025.  Requesting patient to contact me with a decision.  Call back number provided.

## 2025-04-09 NOTE — TELEPHONE ENCOUNTER
Patient returned my call declining to move up procedure date.  Due to being out of town on 4/17/2025.

## 2025-04-12 ENCOUNTER — LAB (OUTPATIENT)
Dept: LAB | Age: 60
End: 2025-04-12
Payer: COMMERCIAL

## 2025-04-12 ENCOUNTER — LAB REQUISITION (OUTPATIENT)
Dept: LAB | Facility: HOSPITAL | Age: 60
End: 2025-04-12
Payer: COMMERCIAL

## 2025-04-12 ENCOUNTER — APPOINTMENT (OUTPATIENT)
Dept: LAB | Age: 60
End: 2025-04-12
Attending: OBSTETRICS & GYNECOLOGY
Payer: COMMERCIAL

## 2025-04-12 DIAGNOSIS — Z15.09 BRCA GENE MUTATION POSITIVE IN FEMALE: ICD-10-CM

## 2025-04-12 DIAGNOSIS — Z15.09 GENETIC SUSCEPTIBILITY TO OTHER MALIGNANT NEOPLASM: ICD-10-CM

## 2025-04-12 DIAGNOSIS — Z15.01 BRCA1 POSITIVE: ICD-10-CM

## 2025-04-12 DIAGNOSIS — Z15.09 BRCA1 POSITIVE: ICD-10-CM

## 2025-04-12 DIAGNOSIS — Z15.01 BRCA GENE MUTATION POSITIVE IN FEMALE: ICD-10-CM

## 2025-04-12 DIAGNOSIS — Z15.02 BRCA GENE MUTATION POSITIVE IN FEMALE: ICD-10-CM

## 2025-04-12 DIAGNOSIS — E03.9 ACQUIRED HYPOTHYROIDISM: ICD-10-CM

## 2025-04-12 DIAGNOSIS — Z15.01 GENETIC SUSCEPTIBILITY TO MALIGNANT NEOPLASM OF BREAST: ICD-10-CM

## 2025-04-12 LAB
ABO GROUP BLD: NORMAL
ALBUMIN SERPL BCG-MCNC: 4.2 G/DL (ref 3.5–5)
ALP SERPL-CCNC: 62 U/L (ref 34–104)
ALT SERPL W P-5'-P-CCNC: 9 U/L (ref 7–52)
ANION GAP SERPL CALCULATED.3IONS-SCNC: 9 MMOL/L (ref 4–13)
AST SERPL W P-5'-P-CCNC: 23 U/L (ref 13–39)
BILIRUB SERPL-MCNC: 0.54 MG/DL (ref 0.2–1)
BLD GP AB SCN SERPL QL: NEGATIVE
BUN SERPL-MCNC: 20 MG/DL (ref 5–25)
CALCIUM SERPL-MCNC: 9.2 MG/DL (ref 8.4–10.2)
CHLORIDE SERPL-SCNC: 102 MMOL/L (ref 96–108)
CHOLEST SERPL-MCNC: 196 MG/DL (ref ?–200)
CO2 SERPL-SCNC: 29 MMOL/L (ref 21–32)
CREAT SERPL-MCNC: 0.65 MG/DL (ref 0.6–1.3)
ERYTHROCYTE [DISTWIDTH] IN BLOOD BY AUTOMATED COUNT: 13.2 % (ref 11.6–15.1)
GFR SERPL CREATININE-BSD FRML MDRD: 96 ML/MIN/1.73SQ M
GLUCOSE P FAST SERPL-MCNC: 102 MG/DL (ref 65–99)
HCT VFR BLD AUTO: 38.2 % (ref 34.8–46.1)
HDLC SERPL-MCNC: 65 MG/DL
HGB BLD-MCNC: 12.6 G/DL (ref 11.5–15.4)
LDLC SERPL CALC-MCNC: 113 MG/DL (ref 0–100)
MCH RBC QN AUTO: 31.3 PG (ref 26.8–34.3)
MCHC RBC AUTO-ENTMCNC: 33 G/DL (ref 31.4–37.4)
MCV RBC AUTO: 95 FL (ref 82–98)
NONHDLC SERPL-MCNC: 131 MG/DL
PLATELET # BLD AUTO: 259 THOUSANDS/UL (ref 149–390)
PMV BLD AUTO: 10.6 FL (ref 8.9–12.7)
POTASSIUM SERPL-SCNC: 3.9 MMOL/L (ref 3.5–5.3)
PROT SERPL-MCNC: 6.9 G/DL (ref 6.4–8.4)
RBC # BLD AUTO: 4.02 MILLION/UL (ref 3.81–5.12)
RH BLD: POSITIVE
SODIUM SERPL-SCNC: 140 MMOL/L (ref 135–147)
SPECIMEN EXPIRATION DATE: NORMAL
TRIGL SERPL-MCNC: 92 MG/DL (ref ?–150)
TSH SERPL DL<=0.05 MIU/L-ACNC: 2.23 UIU/ML (ref 0.45–4.5)
WBC # BLD AUTO: 4.86 THOUSAND/UL (ref 4.31–10.16)

## 2025-04-12 PROCEDURE — 93005 ELECTROCARDIOGRAM TRACING: CPT

## 2025-04-12 PROCEDURE — 84443 ASSAY THYROID STIM HORMONE: CPT

## 2025-04-12 PROCEDURE — 85027 COMPLETE CBC AUTOMATED: CPT

## 2025-04-12 PROCEDURE — 36415 COLL VENOUS BLD VENIPUNCTURE: CPT

## 2025-04-12 PROCEDURE — 86850 RBC ANTIBODY SCREEN: CPT | Performed by: OBSTETRICS & GYNECOLOGY

## 2025-04-12 PROCEDURE — 80061 LIPID PANEL: CPT

## 2025-04-12 PROCEDURE — 80053 COMPREHEN METABOLIC PANEL: CPT

## 2025-04-12 PROCEDURE — 86901 BLOOD TYPING SEROLOGIC RH(D): CPT | Performed by: OBSTETRICS & GYNECOLOGY

## 2025-04-12 PROCEDURE — 86900 BLOOD TYPING SEROLOGIC ABO: CPT | Performed by: OBSTETRICS & GYNECOLOGY

## 2025-04-14 LAB
ATRIAL RATE: 65 BPM
P AXIS: 70 DEGREES
PR INTERVAL: 170 MS
QRS AXIS: 74 DEGREES
QRSD INTERVAL: 100 MS
QT INTERVAL: 402 MS
QTC INTERVAL: 418 MS
T WAVE AXIS: 57 DEGREES
VENTRICULAR RATE: 65 BPM

## 2025-04-14 PROCEDURE — 93010 ELECTROCARDIOGRAM REPORT: CPT | Performed by: INTERNAL MEDICINE

## 2025-04-22 ENCOUNTER — PATIENT MESSAGE (OUTPATIENT)
Dept: GYNECOLOGIC ONCOLOGY | Facility: CLINIC | Age: 60
End: 2025-04-22

## 2025-04-23 RX ORDER — FLUTICASONE PROPIONATE 50 MCG
SPRAY, SUSPENSION (ML) NASAL
COMMUNITY
Start: 2025-03-27

## 2025-04-23 NOTE — PRE-PROCEDURE INSTRUCTIONS
Pre-Surgery Instructions:   Medication Instructions    calcium carbonate-vitamin D (OSCAL-D) 500 mg-200 units per tablet Hold day of surgery.    Cetirizine HCl (ZYRTEC PO) Hold day of surgery.    cholecalciferol (VITAMIN D3) 1,000 units tablet Hold day of surgery.    fluticasone (FLONASE) 50 mcg/act nasal spray Uses PRN- OK to take day of surgery    ipratropium (ATROVENT) 0.03 % nasal spray Uses PRN- OK to take day of surgery    levothyroxine 50 mcg tablet Take day of surgery.    pyridoxine (VITAMIN B6) 100 mg tablet Hold day of surgery.    rOPINIRole (REQUIP) 0.5 mg tablet Take night before surgery   Medication instructions for day of surgery reviewed. Please take all instructed medications with only a sip of water.       You will receive a call one business day prior to surgery with an arrival time and hospital directions. If your surgery is scheduled on a Monday, the hospital will be calling you on the Friday prior to your surgery. If you have not heard from anyone by 8pm, please call the hospital supervisor through the hospital  at [phone]. ([place] [phone] or [place] [phone]).    Do not eat or drink anything after midnight the night before your surgery, including candy, mints, lifesavers, or chewing gum. Do not drink alcohol 24hrs before your surgery. Try not to smoke at least 24hrs before your surgery.       Follow the pre surgery showering instructions as listed in the “My Surgical Experience Booklet” or otherwise provided by your surgeon's office. Do not use a blade to shave the surgical area 1 week before surgery. It is okay to use a clean electric clippers up to 24 hours before surgery. Do not apply any lotions, creams, including makeup, cologne, deodorant, or perfumes after showering on the day of your surgery. Do not use dry shampoo, hair spray, hair gel, or any type of hair products.     No contact lenses, eye make-up, or artificial eyelashes. Remove nail polish, including gel  polish, and any artificial, gel, or acrylic nails if possible. Remove all jewelry including rings and body piercing jewelry.     Wear causal clothing that is easy to take on and off. Consider your type of surgery.    Keep any valuables, jewelry, piercings at home. Please bring any specially ordered equipment (sling, braces) if indicated.    Arrange for a responsible person to drive you to and from the hospital on the day of your surgery. Please confirm the visitor policy for the day of your procedure when you receive your phone call with an arrival time.     Call the surgeon's office with any new illnesses, exposures, or additional questions prior to surgery.    Please reference your “My Surgical Experience Booklet” for additional information to prepare for your upcoming surgery.

## 2025-04-30 LAB
GENE DIS ANL INTERP-IMP: ABNORMAL
INTERPRETATION: ABNORMAL

## 2025-04-30 RX ORDER — IBUPROFEN 600 MG/1
600 TABLET, FILM COATED ORAL EVERY 6 HOURS PRN
Qty: 60 TABLET | Refills: 0 | Status: SHIPPED | OUTPATIENT
Start: 2025-04-30

## 2025-04-30 RX ORDER — SENNOSIDES 8.6 MG
650 CAPSULE ORAL EVERY 8 HOURS PRN
Qty: 60 TABLET | Refills: 0 | Status: SHIPPED | OUTPATIENT
Start: 2025-04-30

## 2025-04-30 NOTE — ASSESSMENT & PLAN NOTE
Proceed to OR for lap BSO  NPO  Anticipate same day surgery  Post operative pain management with tylenol, motrin, prn oxycodone  Follow up in 2 weeks for postoperative visit

## 2025-04-30 NOTE — H&P
For questions/concerns on this patient, please reach out to the following:  RA- GynOnc Resident       H&P Exam - Gynecology Oncology  Katelyn Padilla 59 y.o. female MRN: [id number]  Unit/Bed#: OR [place] Encounter: [id number]        Assessment & Plan   * BRCA gene mutation positive in female  Assessment & Plan  Proceed to OR for lap BSO  NPO  Anticipate same day surgery  Post operative pain management with tylenol, motrin, prn oxycodone  Follow up in 2 weeks for postoperative visit           History of Present Illness     HPI:  Katelyn Padilla is a 59 y.o. female with history of BRCA1 gene mutation who presents for risk reducing BSO. Katelyn has been seen and counseled in the office regarding management options and surgical risks and benefits and desires to proceed with surgical management. Her medical history is notable for hypothyroidism, asthma, and migraine and her surgical history is largely unremarkable with no past abdominal surgeries. Katelyn reports no symptoms today and no changes to her medical or surgical history since her prior visit.    Oncology History    No history exists.       Review of Systems   Constitutional: Negative.    HENT: Negative.     Eyes: Negative.    Respiratory: Negative.     Cardiovascular: Negative.    Gastrointestinal: Negative.    Endocrine: Negative.    Genitourinary: Negative.    Musculoskeletal: Negative.    Neurological: Negative.    Psychiatric/Behavioral: Negative.         Historical Information   Past Medical History:   Diagnosis Date    Allergic rhinitis due to pollen     last assessed 13    Asthma     Common migraine without aura     last assessed 14    Seasonal allergies     last assessed 13     Past Surgical History:   Procedure Laterality Date    CARPAL TUNNEL RELEASE Bilateral     COLONOSCOPY  2023    routine    TONSILLECTOMY       OB History    Para Term  AB Living   2     2   SAB IAB Ectopic Multiple Live Births              # Outcome Date GA Lbr Ed/2nd Weight Sex Type Anes PTL Lv   2             1                Obstetric Comments   Menarche age 11   First child at 26   Menopause at 55     Family History   Problem Relation Age of Onset    Coronary artery disease Mother     Diabetes Mother         in her 40's    Hypertension Mother     Heart failure Mother     Heart disease Mother     Colon cancer Father     Thyroid disease Father     Skin cancer Father     Bone cancer Maternal Aunt 10    Breast cancer Paternal Aunt         70's, treated with hormone therapy alone    Breast cancer Paternal Cousin         50's, treated with hormone therapy alone     Social History   Social History     Substance and Sexual Activity   Alcohol Use Yes    Comment: occasional     Social History     Substance and Sexual Activity   Drug Use No     Social History     Tobacco Use   Smoking Status Never   Smokeless Tobacco Never       Meds/Allergies     Medications Prior to Admission:     calcium carbonate-vitamin D (OSCAL-D) 500 mg-200 units per tablet    Cetirizine HCl (ZYRTEC PO)    cholecalciferol (VITAMIN D3) 1,000 units tablet    ipratropium (ATROVENT) 0.03 % nasal spray    levothyroxine 50 mcg tablet    pyridoxine (VITAMIN B6) 100 mg tablet    rOPINIRole (REQUIP) 0.5 mg tablet    EPINEPHrine (EPIPEN JR) 0.15 mg/0.3 mL SOAJ    fluticasone (FLONASE) 50 mcg/act nasal spray    mometasone (ELOCON) 0.1 % cream    SUMAtriptan (IMITREX) 50 mg tablet  Allergies   Allergen Reactions    Seasonal Ic [Cholestatin] Nasal Congestion       Objective     /63   Pulse 69   Temp 97.6 °F (36.4 °C) (Temporal)   Resp 18     No intake/output data recorded.  No intake/output data recorded.    Lab Results   Component Value Date    WBC 4.86 2025    HGB 12.6 2025    HCT 38.2 2025    MCV 95 2025     2025       Lab Results   Component Value Date    CALCIUM 9.2 2025    K 3.9 2025    CO2 29 2025    CL  102 04/12/2025    BUN 20 04/12/2025    CREATININE 0.65 04/12/2025       Physical Exam  HENT:      Head: Normocephalic and atraumatic.      Nose: Nose normal.   Cardiovascular:      Rate and Rhythm: Normal rate and regular rhythm.   Pulmonary:      Effort: Pulmonary effort is normal.   Abdominal:      General: There is no distension.      Palpations: Abdomen is soft. There is no mass.   Genitourinary:     Comments: defer  Musculoskeletal:         General: No swelling. Normal range of motion.      Cervical back: Normal range of motion.   Skin:     General: Skin is warm and dry.   Neurological:      General: No focal deficit present.      Mental Status: She is alert.   Psychiatric:         Mood and Affect: Mood normal.         Imaging:   EKG, Pathology, and Other Studies:       Code Status: No Order    Lyudmila Bear MD  5/1/2025  11:30 AM

## 2025-04-30 NOTE — DISCHARGE INSTR - AVS FIRST PAGE
[facility] - Gynecologic Oncology  Theresa Long, Marianela Contreras, and Nu  [phone]    Please take the following medications:   Alternate tylenol, motrin  Oxycodone (prescription provided) as needed every 4 hours for severe pain   If you are taking oxycodone, please take a stool softener or Miralax as needed for constipation    2. Activity restrictions:  No heavy lifting > 10 lbs.    Nothing in the vagina until your follow-up visit  No tub baths or swimming  Walking is encouraged. Take care when going up and down stairs.    3. You may shower. You can use soapy water surrounding the incision and let the water run over it. Pat the incision dry after your shower.     4. You have surgical glue over your incision which will dissolve on its own.     5. If you have heavy vaginal bleeding soaking through more than 1 pad per hour, fever with temperature > 100.4F or 38C, nausea/vomiting, severe abdominal pain, or notice redness or drainage from your incision, please call the office.     6. You have a follow-up appointment listed below.

## 2025-05-01 ENCOUNTER — ANESTHESIA (OUTPATIENT)
Dept: PERIOP | Facility: HOSPITAL | Age: 60
End: 2025-05-01
Payer: COMMERCIAL

## 2025-05-01 ENCOUNTER — HOSPITAL ENCOUNTER (OUTPATIENT)
Facility: HOSPITAL | Age: 60
Setting detail: OUTPATIENT SURGERY
Discharge: HOME | End: 2025-05-01
Attending: OBSTETRICS & GYNECOLOGY | Admitting: OBSTETRICS & GYNECOLOGY
Payer: COMMERCIAL

## 2025-05-01 ENCOUNTER — ANESTHESIA EVENT (OUTPATIENT)
Dept: PERIOP | Facility: HOSPITAL | Age: 60
End: 2025-05-01
Payer: COMMERCIAL

## 2025-05-01 VITALS
TEMPERATURE: 97 F | HEART RATE: 57 BPM | OXYGEN SATURATION: 99 % | DIASTOLIC BLOOD PRESSURE: 63 MMHG | RESPIRATION RATE: 18 BRPM | SYSTOLIC BLOOD PRESSURE: 131 MMHG

## 2025-05-01 DIAGNOSIS — Z15.09 BRCA1 POSITIVE: ICD-10-CM

## 2025-05-01 DIAGNOSIS — G89.18 POST-OP PAIN: Primary | ICD-10-CM

## 2025-05-01 DIAGNOSIS — Z15.01 BRCA1 POSITIVE: ICD-10-CM

## 2025-05-01 LAB
ABO GROUP BLD: NORMAL
GLUCOSE SERPL-MCNC: 97 MG/DL (ref 65–140)
RH BLD: POSITIVE

## 2025-05-01 PROCEDURE — 82948 REAGENT STRIP/BLOOD GLUCOSE: CPT

## 2025-05-01 PROCEDURE — 88112 CYTOPATH CELL ENHANCE TECH: CPT | Performed by: PATHOLOGY

## 2025-05-01 PROCEDURE — NC001 PR NO CHARGE: Performed by: OBSTETRICS & GYNECOLOGY

## 2025-05-01 PROCEDURE — 88305 TISSUE EXAM BY PATHOLOGIST: CPT | Performed by: PATHOLOGY

## 2025-05-01 PROCEDURE — 58661 LAPAROSCOPY REMOVE ADNEXA: CPT | Performed by: OBSTETRICS & GYNECOLOGY

## 2025-05-01 RX ORDER — PROPOFOL 10 MG/ML
INJECTION, EMULSION INTRAVENOUS AS NEEDED
Status: DISCONTINUED | OUTPATIENT
Start: 2025-05-01 | End: 2025-05-01

## 2025-05-01 RX ORDER — LIDOCAINE HYDROCHLORIDE 10 MG/ML
INJECTION, SOLUTION EPIDURAL; INFILTRATION; INTRACAUDAL; PERINEURAL AS NEEDED
Status: DISCONTINUED | OUTPATIENT
Start: 2025-05-01 | End: 2025-05-01

## 2025-05-01 RX ORDER — SODIUM CHLORIDE, SODIUM LACTATE, POTASSIUM CHLORIDE, CALCIUM CHLORIDE 600; 310; 30; 20 MG/100ML; MG/100ML; MG/100ML; MG/100ML
50 INJECTION, SOLUTION INTRAVENOUS CONTINUOUS
Status: DISCONTINUED | OUTPATIENT
Start: 2025-05-01 | End: 2025-05-01 | Stop reason: HOSPADM

## 2025-05-01 RX ORDER — HYDROMORPHONE HCL/PF 1 MG/ML
SYRINGE (ML) INJECTION AS NEEDED
Status: DISCONTINUED | OUTPATIENT
Start: 2025-05-01 | End: 2025-05-01

## 2025-05-01 RX ORDER — SODIUM CHLORIDE, SODIUM LACTATE, POTASSIUM CHLORIDE, CALCIUM CHLORIDE 600; 310; 30; 20 MG/100ML; MG/100ML; MG/100ML; MG/100ML
INJECTION, SOLUTION INTRAVENOUS CONTINUOUS PRN
Status: DISCONTINUED | OUTPATIENT
Start: 2025-05-01 | End: 2025-05-01

## 2025-05-01 RX ORDER — FENTANYL CITRATE 50 UG/ML
INJECTION, SOLUTION INTRAMUSCULAR; INTRAVENOUS AS NEEDED
Status: DISCONTINUED | OUTPATIENT
Start: 2025-05-01 | End: 2025-05-01

## 2025-05-01 RX ORDER — FENTANYL CITRATE/PF 50 MCG/ML
50 SYRINGE (ML) INJECTION
Status: DISCONTINUED | OUTPATIENT
Start: 2025-05-01 | End: 2025-05-01 | Stop reason: HOSPADM

## 2025-05-01 RX ORDER — MIDAZOLAM HYDROCHLORIDE 2 MG/2ML
INJECTION, SOLUTION INTRAMUSCULAR; INTRAVENOUS AS NEEDED
Status: DISCONTINUED | OUTPATIENT
Start: 2025-05-01 | End: 2025-05-01

## 2025-05-01 RX ORDER — ONDANSETRON 2 MG/ML
4 INJECTION INTRAMUSCULAR; INTRAVENOUS ONCE AS NEEDED
Status: DISCONTINUED | OUTPATIENT
Start: 2025-05-01 | End: 2025-05-01 | Stop reason: HOSPADM

## 2025-05-01 RX ORDER — ONDANSETRON 2 MG/ML
INJECTION INTRAMUSCULAR; INTRAVENOUS AS NEEDED
Status: DISCONTINUED | OUTPATIENT
Start: 2025-05-01 | End: 2025-05-01

## 2025-05-01 RX ORDER — PROPOFOL 10 MG/ML
INJECTION, EMULSION INTRAVENOUS CONTINUOUS PRN
Status: DISCONTINUED | OUTPATIENT
Start: 2025-05-01 | End: 2025-05-01

## 2025-05-01 RX ORDER — ROCURONIUM BROMIDE 10 MG/ML
INJECTION, SOLUTION INTRAVENOUS AS NEEDED
Status: DISCONTINUED | OUTPATIENT
Start: 2025-05-01 | End: 2025-05-01

## 2025-05-01 RX ORDER — BUPIVACAINE HYDROCHLORIDE 2.5 MG/ML
INJECTION, SOLUTION EPIDURAL; INFILTRATION; INTRACAUDAL; PERINEURAL AS NEEDED
Status: DISCONTINUED | OUTPATIENT
Start: 2025-05-01 | End: 2025-05-01 | Stop reason: HOSPADM

## 2025-05-01 RX ORDER — HYDROMORPHONE HCL/PF 1 MG/ML
0.5 SYRINGE (ML) INJECTION
Status: DISCONTINUED | OUTPATIENT
Start: 2025-05-01 | End: 2025-05-01 | Stop reason: HOSPADM

## 2025-05-01 RX ORDER — DEXAMETHASONE SODIUM PHOSPHATE 10 MG/ML
INJECTION, SOLUTION INTRAMUSCULAR; INTRAVENOUS AS NEEDED
Status: DISCONTINUED | OUTPATIENT
Start: 2025-05-01 | End: 2025-05-01

## 2025-05-01 RX ORDER — OXYCODONE HYDROCHLORIDE 5 MG/1
5 TABLET ORAL EVERY 4 HOURS PRN
Qty: 5 TABLET | Refills: 0 | Status: SHIPPED | OUTPATIENT
Start: 2025-05-01 | End: 2025-05-11

## 2025-05-01 RX ADMIN — ROCURONIUM BROMIDE 50 MG: 10 INJECTION, SOLUTION INTRAVENOUS at 12:10

## 2025-05-01 RX ADMIN — LIDOCAINE HYDROCHLORIDE 50 MG: 10 INJECTION, SOLUTION EPIDURAL; INFILTRATION; INTRACAUDAL; PERINEURAL at 12:08

## 2025-05-01 RX ADMIN — MIDAZOLAM HYDROCHLORIDE 2 MG: 2 INJECTION, SOLUTION INTRAMUSCULAR; INTRAVENOUS at 12:23

## 2025-05-01 RX ADMIN — DEXAMETHASONE SODIUM PHOSPHATE 10 MG: 10 INJECTION, SOLUTION INTRAMUSCULAR; INTRAVENOUS at 12:14

## 2025-05-01 RX ADMIN — SODIUM CHLORIDE, SODIUM LACTATE, POTASSIUM CHLORIDE, CALCIUM CHLORIDE: 600; 310; 30; 20 INJECTION, SOLUTION INTRAVENOUS at 12:05

## 2025-05-01 RX ADMIN — ONDANSETRON 4 MG: 2 INJECTION INTRAMUSCULAR; INTRAVENOUS at 12:50

## 2025-05-01 RX ADMIN — SODIUM CHLORIDE, SODIUM LACTATE, POTASSIUM CHLORIDE, CALCIUM CHLORIDE: 600; 310; 30; 20 INJECTION, SOLUTION INTRAVENOUS at 11:20

## 2025-05-01 RX ADMIN — FENTANYL CITRATE 50 MCG: 50 INJECTION, SOLUTION INTRAMUSCULAR; INTRAVENOUS at 12:27

## 2025-05-01 RX ADMIN — PROPOFOL 50 MCG/KG/MIN: 10 INJECTION, EMULSION INTRAVENOUS at 12:14

## 2025-05-01 RX ADMIN — Medication 0.5 MG: at 12:37

## 2025-05-01 RX ADMIN — FENTANYL CITRATE 50 MCG: 50 INJECTION, SOLUTION INTRAMUSCULAR; INTRAVENOUS at 12:08

## 2025-05-01 RX ADMIN — PROPOFOL 150 MG: 10 INJECTION, EMULSION INTRAVENOUS at 12:09

## 2025-05-01 NOTE — ANESTHESIA POSTPROCEDURE EVALUATION
Post-Op Assessment Note    CV Status:  Stable  Pain Score: 0    Pain management: adequate       Mental Status:  Awake and sleepy   Hydration Status:  Euvolemic   PONV Controlled:  Controlled   Airway Patency:  Patent     Post Op Vitals Reviewed: Yes    No anethesia notable event occurred.    Staff: Anesthesiologist, CRNA           Last Filed PACU Vitals:  Vitals Value Taken Time   Temp     Pulse 64    /80    Resp 12    SpO2 100

## 2025-05-01 NOTE — ANESTHESIA PREPROCEDURE EVALUATION
Procedure:  LAPAROSCOPIC BILATERAL SALPINGO-OOPHORECTOMY, EXAM UNDER ANESTHESIA, POSSIBLE STAGING, ALL OTHER INDICATED PROCEDURES (Abdomen)    Relevant Problems   ANESTHESIA (within normal limits)      CARDIO   (+) Classic migraine with aura      ENDO   (+) Acquired hypothyroidism      NEURO/PSYCH   (+) Classic migraine with aura      PULMONARY   (+) Asthma        Physical Exam    Airway    Mallampati score: II  TM Distance: >3 FB  Neck ROM: full     Dental   No notable dental hx     Cardiovascular      Pulmonary      Other Findings  post-pubertal.      Anesthesia Plan  ASA Score- 2     Anesthesia Type- general with ASA Monitors.         Additional Monitors:     Airway Plan:            Plan Factors-Exercise tolerance (METS): >4 METS.    Chart reviewed. EKG reviewed.  Existing labs reviewed. Patient summary reviewed.    Patient is not a current smoker.              Induction- intravenous.    Postoperative Plan- Plan for postoperative opioid use.         Informed Consent- Anesthetic plan and risks discussed with patient.  I personally reviewed this patient with the CRNA. Discussed and agreed on the Anesthesia Plan with the CRNA..      NPO Status:  Vitals Value Taken Time   Date of last liquid 04/30/25 05/01/25 0951   Time of last liquid 2100 05/01/25 0951   Date of last solid 04/30/25 05/01/25 0951   Time of last solid 2100 05/01/25 0951

## 2025-05-01 NOTE — OP NOTE
OPERATIVE REPORT  PATIENT NAME: Katelyn Padilla    :  1965  MRN: [id number]  Pt Location: AN OR ROOM 04    SURGERY DATE: 2025    Surgeons and Role:     * Lyudmila Bear MD - Primary     * Sowmya Greer MD - Assisting    Preop Diagnosis:  BRCA1 positive [Z15.01, Z15.09]    Post-Op Diagnosis Codes:     * BRCA1 positive [Z15.01, Z15.09]    Procedure(s):  LAPAROSCOPIC BILATERAL SALPINGO-OOPHORECTOMY. EXAM UNDER ANESTHESIA.    Specimen(s):  ID Type Source Tests Collected by Time Destination   1 : WITH BILATERAL OVARIES Tissue Fallopian Tubes, Bilateral TISSUE EXAM Lyudmila Bear MD 2025 12:14 PM    2 :  Washing Peritoneal Washings NON-GYNECOLOGIC CYTOLOGY Lyudmila Bear MD 2025 12:33 PM        Estimated Blood Loss:   Minimal    Drains:  * No LDAs found *    Anesthesia Type:   General    Operative Indications:  BRCA1 positive [Z15.01, Z15.09]60yo with hx of BRCA1 mutation. She has a good understanding and has agreed to proceed with definitive surgical management and risk reduction    Operative Findings:  Normal bowel, omentum, liver, gallbladder.  Normal appearing upper abdomen. Normal appearing uterus, bilateral fallopian tubes and ovaries.     Complications:   None    Procedure and Technique:  The patient was met in the pre-operative holding area where consents and procedures were reviewed and all questions answered.  She was brought to the Operating Room where general anesthesia was induced. The abdomen, vagina and perineum were prepped and draped. .    With the abdomen under anterior traction with two michael-umbilical towel clamps, a Veress needle was inserted through base of umbilicus into the abdominal cavity with the carbon dioxide on low flow. Immediate pressure drop to 0 mm and diffuse abdominal distention indicated intraperitoneal placement. The peritoneal cavity was then insufflated with carbon dioxide on high flow to maintain a pressure of 15 mm Hg throughout the case.  A 5mm port was placed  through the umbilicus under direct visualization, confirming atraumatic entry. An abdominal survey was performed. Additional ports were placed under direct visualization with a 5 mm in the right and 5 mm in the left lower quadrants.     Peritoneal washings were obtained at this time.Procedure was completed bilaterally identically.  Rtroperitoneal space was entered and the ureters were then identified retroperitoneally, coursing well posterior to the ovarian vessels. Ovarian vascular pedicles were skeletonized, cauterized with bipolar cautery and divided.  Adnexa was then mobilized off the pelvic side wall and ultimately freed from uterine fundus with bipolar cautery. Adnexa were placed into endocatch bag and removed intact.  All vascular pedicles were examined and found to be hemostatic.     The laparoscopic instruments were removed. The laparoscopic skin incisions were irrigated and noted to be hemostatic. The skin incisions were closed with subcuticular stitches of 4-0 Biosyn.  Incisions were infiltrated with 0.5% marcaine     All sponge, needle and instrument counts were correct x2.  Anesthesia was reversed and the patient was taken to the PACU in a stable condition.    I was present for the entire procedure.    Patient Disposition:  PACU     SIGNATURE: Lyudmila Bear MD  DATE: May 1, 2025  TIME: 12:51 PM

## 2025-05-05 PROCEDURE — 88112 CYTOPATH CELL ENHANCE TECH: CPT | Performed by: PATHOLOGY

## 2025-05-05 PROCEDURE — 88305 TISSUE EXAM BY PATHOLOGIST: CPT | Performed by: PATHOLOGY

## 2025-05-07 NOTE — ANESTHESIA POSTPROCEDURE EVALUATION
Post-Op Assessment Note    CV Status:  Stable    Pain management: adequate       Mental Status:  Alert and awake   Hydration Status:  Euvolemic   PONV Controlled:  Controlled   Airway Patency:  Patent     Post Op Vitals Reviewed: Yes    No anethesia notable event occurred.    Staff: Anesthesiologist     Reason for prolonged intubation > 24 hours:  N/aReason for prolonged intubation > 48 hours:  N/a      Last Filed PACU Vitals:  Vitals Value Taken Time   Temp 97.9 °F (36.6 °C) 05/01/25 1325   Pulse 62 05/01/25 1326   /74 05/01/25 1325   Resp 19 05/01/25 1326   SpO2 100 % 05/01/25 1326   Vitals shown include unfiled device data.    Modified Lexa:     Vitals Value Taken Time   Activity 2 05/01/25 1325   Respiration 2 05/01/25 1325   Circulation 2 05/01/25 1325   Consciousness 2 05/01/25 1325   Oxygen Saturation 2 05/01/25 1325     Modified Lexa Score: 10

## 2025-05-16 ENCOUNTER — OFFICE VISIT (OUTPATIENT)
Dept: GYNECOLOGIC ONCOLOGY | Facility: CLINIC | Age: 60
End: 2025-05-16

## 2025-05-16 VITALS
TEMPERATURE: 97.9 F | WEIGHT: 134 LBS | SYSTOLIC BLOOD PRESSURE: 138 MMHG | OXYGEN SATURATION: 97 % | DIASTOLIC BLOOD PRESSURE: 78 MMHG | HEART RATE: 83 BPM | BODY MASS INDEX: 23 KG/M2

## 2025-05-16 DIAGNOSIS — Z15.09 BRCA GENE MUTATION POSITIVE IN FEMALE: Primary | ICD-10-CM

## 2025-05-16 DIAGNOSIS — Z15.02 BRCA GENE MUTATION POSITIVE IN FEMALE: Primary | ICD-10-CM

## 2025-05-16 DIAGNOSIS — Z98.890 POSTOPERATIVE STATE: ICD-10-CM

## 2025-05-16 DIAGNOSIS — Z15.01 BRCA GENE MUTATION POSITIVE IN FEMALE: Primary | ICD-10-CM

## 2025-05-16 PROCEDURE — 99024 POSTOP FOLLOW-UP VISIT: CPT | Performed by: PHYSICIAN ASSISTANT

## 2025-05-16 NOTE — PROGRESS NOTES
Name: Katelyn Padilla      : 1965      MRN: [id number]  Encounter Provider: Sarah Aguilar PA-C  Encounter Date: 2025   Encounter department: [facility] [place]  :  Assessment & Plan  BRCA gene mutation positive in female  59-year-old with BRCA1 mutation now s/p prophylactic laparoscopic bilateral BSO on 5/15/25. Her final pathology was benign. She has recovered well from surgery.     Discussed continued lifting restrictions for an additional 2 weeks.     Continued f/u with surg-onc for breast surveillance.     Return to the office prn. Continue routine gynecologic follow-up.        Postoperative state                 History of Present Illness     Reason for Visit / CC:  Post-op Visit    Katelyn Padilla is a 59 y.o. female   who presents to the office for post-operative evaluation. She has recovered well from surgery. She has been afebrile. Denies n/v/abdominal pain. No vaginal bleeding or discharge. Normal bowel/bladder function.       Pertinent Medical History   BRCA1 mutation    EUA, laparoscopic BSO on 25.        Review of Systems   Constitutional: Negative.    Respiratory: Negative.     Cardiovascular: Negative.    Gastrointestinal: Negative.    Genitourinary: Negative.    Skin: Negative.     A complete review of systems is negative other than that noted above in the HPI.  Medical History Reviewed by provider this encounter:  Tobacco  Allergies  Meds  Problems  Med Hx  Surg Hx  Fam Hx     .  Medications Ordered Prior to Encounter[1]      Objective   /78 (BP Location: Left arm, Patient Position: Sitting, Cuff Size: Standard)   Pulse 83   Temp 97.9 °F (36.6 °C) (Temporal)   Wt 60.8 kg (134 lb)   SpO2 97%   BMI 23.00 kg/m²     Body mass index is 23 kg/m².  Pain Screening:  Pain Score: 0-No pain    Physical Exam  Vitals reviewed.   Constitutional:       General: She is not in acute distress.     Appearance: Normal appearance.   HENT:       Head: Normocephalic and atraumatic.      Mouth/Throat:      Mouth: Mucous membranes are moist.   Pulmonary:      Effort: Pulmonary effort is normal.      Breath sounds: Normal breath sounds.   Abdominal:      Palpations: Abdomen is soft. There is no mass.      Tenderness: There is no abdominal tenderness.     Skin:     General: Skin is warm and dry.      Comments: Surgical trocar sites are intact, clean and dry without induration, erythema or purulent drainage.      Neurological:      Mental Status: She is alert and oriented to person, place, and time.     Psychiatric:         Mood and Affect: Mood normal.         Behavior: Behavior normal.         Thought Content: Thought content normal.         Judgment: Judgment normal.              Other Study Results Review : Pathology reports reviewed.    Case Report   Surgical Pathology Report                         Case: [id number]                                   Authorizing Provider:  Lyudmila Bear MD           Collected:           05/01/2025 1214               Ordering Location:     [facility]        Received:            05/01/2025 [address] [place] [address] Operating Room                                                       Pathologist:           Ronaldo New MD                                                         Specimen:    Ovaries, Right & Left, Fallopian Tubes, Bilateral WITH BILATERAL OVARIES                  Final Diagnosis   A.  Bilateral ovaries and fallopian tubes:     - Ovaries:       -- Surface epithelial inclusion glands and cysts.       -- No malignancy identified.     - Fallopian tubes:       -- No significant pathologic abnormalities.       -- No malignancy identified.      Electronically signed by Ronaldo New MD on 5/5/2025 at 0952 EDT                [1]  Current Outpatient Medications on File Prior to Visit   Medication Sig Dispense Refill   • acetaminophen (TYLENOL) 650 mg CR tablet Take 1 tablet  (650 mg total) by mouth every 8 (eight) hours as needed for mild pain 60 tablet 0   • calcium carbonate-vitamin D (OSCAL-D) 500 mg-200 units per tablet Take 1 tablet by mouth daily with breakfast 30 tablet 5   • Cetirizine HCl (ZYRTEC PO) Take by mouth as needed     • cholecalciferol (VITAMIN D3) 1,000 units tablet Take 1 tablet (1,000 Units total) by mouth daily 30 tablet 5   • fluticasone (FLONASE) 50 mcg/act nasal spray      • ibuprofen (MOTRIN) 600 mg tablet Take 1 tablet (600 mg total) by mouth every 6 (six) hours as needed for mild pain 60 tablet 0   • ipratropium (ATROVENT) 0.03 % nasal spray      • levothyroxine 50 mcg tablet TAKE 1 TABLET DAILY 90 tablet 1   • mometasone (ELOCON) 0.1 % cream Apply topically daily 60 g 0   • pyridoxine (VITAMIN B6) 100 mg tablet      • rOPINIRole (REQUIP) 0.5 mg tablet TAKE 1 TABLET DAILY AT BEDTIME 90 tablet 1   • SUMAtriptan (IMITREX) 50 mg tablet Take 1 tablet (50 mg total) by mouth once as needed for migraine for up to 36 doses 9 tablet 3   • EPINEPHrine (EPIPEN JR) 0.15 mg/0.3 mL SOAJ Inject 0.3 mL (0.15 mg total) into a muscle once for 1 dose 0.3 mL 0     No current facility-administered medications on file prior to visit.

## 2025-05-16 NOTE — LETTER
May 16, 2025     Patient: Katelyn Padilla  YOB: 1965  Date of Visit: 5/16/2025      To Whom it May Concern:    Katelyn Padilla is under my professional care. Katelyn was seen in my office on 5/16/2025. Katelyn may return to work on Monday, May 19th, 2025.    If you have any questions or concerns, please don't hesitate to call.         Sincerely,          Sarah Aguilar PA-C        CC: No Recipients

## 2025-05-16 NOTE — ASSESSMENT & PLAN NOTE
59-year-old with BRCA1 mutation now s/p prophylactic laparoscopic bilateral BSO on 5/15/25. Her final pathology was benign. She has recovered well from surgery.     Discussed continued lifting restrictions for an additional 2 weeks.     Continued f/u with surg-onc for breast surveillance.     Return to the office prn. Continue routine gynecologic follow-up.

## 2025-06-24 ENCOUNTER — OFFICE VISIT (OUTPATIENT)
Dept: INTERNAL MEDICINE CLINIC | Age: 60
End: 2025-06-24
Payer: COMMERCIAL

## 2025-06-24 VITALS
DIASTOLIC BLOOD PRESSURE: 68 MMHG | HEART RATE: 61 BPM | TEMPERATURE: 97.6 F | WEIGHT: 136 LBS | BODY MASS INDEX: 23.22 KG/M2 | HEIGHT: 64 IN | OXYGEN SATURATION: 99 % | SYSTOLIC BLOOD PRESSURE: 110 MMHG

## 2025-06-24 DIAGNOSIS — G43.009 MIGRAINE WITHOUT AURA AND WITHOUT STATUS MIGRAINOSUS, NOT INTRACTABLE: ICD-10-CM

## 2025-06-24 DIAGNOSIS — J45.20 MILD INTERMITTENT ASTHMA WITHOUT COMPLICATION: ICD-10-CM

## 2025-06-24 DIAGNOSIS — G25.81 RESTLESS LEG SYNDROME: ICD-10-CM

## 2025-06-24 DIAGNOSIS — E55.9 VITAMIN D DEFICIENCY: Primary | ICD-10-CM

## 2025-06-24 DIAGNOSIS — E03.9 ACQUIRED HYPOTHYROIDISM: ICD-10-CM

## 2025-06-24 PROBLEM — Z98.890 POSTOPERATIVE STATE: Status: RESOLVED | Noted: 2025-05-16 | Resolved: 2025-06-24

## 2025-06-24 PROCEDURE — 99214 OFFICE O/P EST MOD 30 MIN: CPT | Performed by: INTERNAL MEDICINE

## 2025-06-24 RX ORDER — ROPINIROLE 1 MG/1
1 TABLET, FILM COATED ORAL
Qty: 90 TABLET | Refills: 1 | Status: SHIPPED | OUTPATIENT
Start: 2025-06-24

## 2025-06-24 RX ORDER — LEVOTHYROXINE SODIUM 50 UG/1
50 TABLET ORAL DAILY
Qty: 90 TABLET | Refills: 1 | Status: SHIPPED | OUTPATIENT
Start: 2025-06-24

## 2025-06-24 NOTE — ASSESSMENT & PLAN NOTE
{If prescribing CGRP gepants (Nurtec, Ubrelvy, Qulipta) or monoclonal antibodies (Emagality, Ajovy, Aimovig) that currently do not have a prior auth on file, click here to fill out prior auth smartform and then hit F2 with this smartlist to insert prior auth documentation (Optional):[id number]}  Brain is very well-controlled continue with the same medication no changes

## 2025-06-24 NOTE — PROGRESS NOTES
Name: Katelyn Padilla      : 1965      MRN: [id number]  Encounter Provider: Edwar Vyas MD  Encounter Date: 2025   Encounter department: [facility]  :  Assessment & Plan  Acquired hypothyroidism  TSH was normal  Orders:    levothyroxine 50 mcg tablet; Take 1 tablet (50 mcg total) by mouth daily    TSH, 3rd generation; Future    Vitamin D deficiency  Last vitamin D levels were excellent       Mild intermittent asthma without complication  No complications of asthma she does not have any asthma exacerbation recently       Restless leg syndrome  Increase the dose of the Requip as sometimes she have to take 2 pills together to help her and she is taking earlier at night and I have no problem with that as long as her symptoms are very well-controlled  Orders:    rOPINIRole (REQUIP) 1 mg tablet; Take 1 tablet (1 mg total) by mouth daily at bedtime    Migraine without aura and without status migrainosus, not intractable    Brain is very well-controlled continue with the same medication no changes              History of Present Illness   HPI  Review of Systems   Constitutional:  Negative for chills and fatigue.   HENT:  Negative for congestion, ear pain, hearing loss, postnasal drip, sinus pressure, sore throat and voice change.    Eyes:  Negative for pain, discharge and visual disturbance.   Respiratory:  Negative for cough, chest tightness and shortness of breath.    Cardiovascular:  Negative for chest pain, palpitations and leg swelling.   Gastrointestinal:  Negative for abdominal pain, blood in stool, diarrhea, nausea and rectal pain.   Genitourinary:  Negative for difficulty urinating, dysuria and urgency.   Musculoskeletal:  Negative for arthralgias and joint swelling.   Skin:  Negative for rash.   Allergic/Immunologic: Negative for environmental allergies and food allergies.   Neurological:  Negative for dizziness, tremors, weakness, numbness and headaches.  "  Hematological:  Negative for adenopathy.   Psychiatric/Behavioral:  Negative for behavioral problems and hallucinations.        Objective   /68 (BP Location: Left arm, Patient Position: Sitting, Cuff Size: Standard)   Pulse 61   Temp 97.6 °F (36.4 °C) (Temporal)   Ht 5' 4\" (1.626 m)   Wt 61.7 kg (136 lb)   SpO2 99%   BMI 23.34 kg/m²      Physical Exam  Vitals and nursing note reviewed.   Constitutional:       General: She is not in acute distress.     Appearance: She is well-developed.   HENT:      Head: Normocephalic and atraumatic.     Eyes:      Conjunctiva/sclera: Conjunctivae normal.       Cardiovascular:      Rate and Rhythm: Normal rate and regular rhythm.      Heart sounds: No murmur heard.  Pulmonary:      Effort: Pulmonary effort is normal. No respiratory distress.      Breath sounds: Normal breath sounds.   Abdominal:      Palpations: Abdomen is soft.      Tenderness: There is no abdominal tenderness.     Musculoskeletal:         General: No swelling.      Cervical back: Neck supple.     Skin:     General: Skin is warm and dry.      Capillary Refill: Capillary refill takes less than 2 seconds.     Neurological:      General: No focal deficit present.      Mental Status: She is alert.     Psychiatric:         Mood and Affect: Mood normal.         "

## 2025-06-24 NOTE — ASSESSMENT & PLAN NOTE
TSH was normal  Orders:    levothyroxine 50 mcg tablet; Take 1 tablet (50 mcg total) by mouth daily    TSH, 3rd generation; Future

## (undated) DEVICE — GLOVE INDICATOR PI UNDERGLOVE SZ 6.5 BLUE

## (undated) DEVICE — TROCAR: Brand: KII FIOS FIRST ENTRY

## (undated) DEVICE — CHLORAPREP HI-LITE 26ML ORANGE

## (undated) DEVICE — TOWEL SURG XR DETECT GREEN STRL RFD

## (undated) DEVICE — LAPAROTOMY DRAPE WITH POUCHES: Brand: CONVERTORS

## (undated) DEVICE — PREMIUM DRY TRAY LF: Brand: MEDLINE INDUSTRIES, INC.

## (undated) DEVICE — INTENDED FOR TISSUE SEPARATION, AND OTHER PROCEDURES THAT REQUIRE A SHARP SURGICAL BLADE TO PUNCTURE OR CUT.: Brand: BARD-PARKER SAFETY BLADES SIZE 11, STERILE

## (undated) DEVICE — SUT MONOCRYL 4-0 PS-2 27 IN Y426H

## (undated) DEVICE — 40595 XL TRENDELENBURG POSITIONING KIT: Brand: 40595 XL TRENDELENBURG POSITIONING KIT

## (undated) DEVICE — BETHLEHEM UNIVERSAL GYN LAP PK: Brand: CARDINAL HEALTH

## (undated) DEVICE — GLOVE SRG BIOGEL 6.5

## (undated) DEVICE — INSUFFLATION NEEDLE TO ESTABLISH PNEUMOPERITONEUM.: Brand: INSUFFLATION NEEDLE

## (undated) DEVICE — EXOFIN PRECISION PEN HIGH VISCOSITY TOPICAL SKIN ADHESIVE: Brand: EXOFIN PRECISION PEN, 1G

## (undated) DEVICE — TROCAR: Brand: KII® SLEEVE

## (undated) DEVICE — BLUE HEAT SCOPE WARMER